# Patient Record
Sex: MALE | Race: WHITE | NOT HISPANIC OR LATINO | Employment: FULL TIME | URBAN - NONMETROPOLITAN AREA
[De-identification: names, ages, dates, MRNs, and addresses within clinical notes are randomized per-mention and may not be internally consistent; named-entity substitution may affect disease eponyms.]

---

## 2023-11-01 PROBLEM — F17.210 CIGARETTE NICOTINE DEPENDENCE WITHOUT COMPLICATION: Status: ACTIVE | Noted: 2023-11-01

## 2023-11-01 PROBLEM — J45.30 MILD PERSISTENT ASTHMA (HHS-HCC): Status: ACTIVE | Noted: 2023-11-01

## 2023-11-01 RX ORDER — ALBUTEROL SULFATE 90 UG/1
AEROSOL, METERED RESPIRATORY (INHALATION) EVERY 4 HOURS
COMMUNITY
Start: 2022-12-20

## 2023-11-02 ENCOUNTER — OFFICE VISIT (OUTPATIENT)
Dept: PRIMARY CARE | Facility: CLINIC | Age: 28
End: 2023-11-02
Payer: COMMERCIAL

## 2023-11-02 VITALS
OXYGEN SATURATION: 98 % | WEIGHT: 165 LBS | HEART RATE: 98 BPM | SYSTOLIC BLOOD PRESSURE: 124 MMHG | BODY MASS INDEX: 22.35 KG/M2 | TEMPERATURE: 98.6 F | DIASTOLIC BLOOD PRESSURE: 50 MMHG | HEIGHT: 72 IN

## 2023-11-02 DIAGNOSIS — F41.1 GAD (GENERALIZED ANXIETY DISORDER): Primary | ICD-10-CM

## 2023-11-02 PROCEDURE — 4004F PT TOBACCO SCREEN RCVD TLK: CPT | Performed by: FAMILY MEDICINE

## 2023-11-02 PROCEDURE — 99214 OFFICE O/P EST MOD 30 MIN: CPT | Performed by: FAMILY MEDICINE

## 2023-11-02 RX ORDER — CLONAZEPAM 0.5 MG/1
0.5 TABLET ORAL NIGHTLY
Qty: 30 TABLET | Refills: 1 | Status: SHIPPED | OUTPATIENT
Start: 2023-11-02 | End: 2023-11-14 | Stop reason: ALTCHOICE

## 2023-11-02 ASSESSMENT — ENCOUNTER SYMPTOMS
DEPRESSION: 0
DYSPHORIC MOOD: 1
FATIGUE: 1
SLEEP DISTURBANCE: 1
TREMORS: 0
DIZZINESS: 0
FEVER: 0
SEIZURES: 0
HALLUCINATIONS: 0
NERVOUS/ANXIOUS: 1
HEADACHES: 0
NUMBNESS: 0
LOSS OF SENSATION IN FEET: 0
OCCASIONAL FEELINGS OF UNSTEADINESS: 0
CHILLS: 0
SHORTNESS OF BREATH: 0
PALPITATIONS: 0
COUGH: 0

## 2023-11-02 ASSESSMENT — PAIN SCALES - GENERAL: PAINLEVEL: 0-NO PAIN

## 2023-11-02 ASSESSMENT — PATIENT HEALTH QUESTIONNAIRE - PHQ9
1. LITTLE INTEREST OR PLEASURE IN DOING THINGS: NOT AT ALL
SUM OF ALL RESPONSES TO PHQ9 QUESTIONS 1 AND 2: 0
2. FEELING DOWN, DEPRESSED OR HOPELESS: NOT AT ALL

## 2023-11-02 NOTE — PROGRESS NOTES
Subjective   Patient ID: Chris Perkins is a 28 y.o. male who presents for Anxiety.    Anxiety  Symptoms include nervous/anxious behavior. Patient reports no chest pain, dizziness, palpitations, shortness of breath or suicidal ideas.            Review of Systems   Constitutional:  Positive for fatigue. Negative for chills and fever.   Respiratory:  Negative for cough and shortness of breath.    Cardiovascular:  Negative for chest pain and palpitations.   Neurological:  Negative for dizziness, tremors, seizures, syncope, numbness and headaches.   Psychiatric/Behavioral:  Positive for dysphoric mood and sleep disturbance. Negative for hallucinations and suicidal ideas. The patient is nervous/anxious.        Objective   /50   Pulse 98   Temp 37 °C (98.6 °F)   Ht 1.829 m (6')   Wt 74.8 kg (165 lb)   SpO2 98%   BMI 22.38 kg/m²     Physical Exam  Constitutional:       Appearance: Normal appearance.   Cardiovascular:      Rate and Rhythm: Normal rate and regular rhythm.   Pulmonary:      Effort: Pulmonary effort is normal.      Breath sounds: Normal breath sounds.   Neurological:      General: No focal deficit present.      Mental Status: He is alert and oriented to person, place, and time.   Psychiatric:         Mood and Affect: Mood is anxious. Affect is tearful.         Speech: Speech normal. Speech is not rapid and pressured, slurred or tangential.         Behavior: Behavior normal. Behavior is cooperative.         Thought Content: Thought content normal.         Cognition and Memory: Cognition normal.         Judgment: Judgment normal.         Assessment/Plan   Problem List Items Addressed This Visit             ICD-10-CM    KELSEY (generalized anxiety disorder) - Primary F41.1   Patient has failed Wellbutrin BuSpar as well as Lexapro in the past.  He has had some good luck with use of Klonopin low-dose.  He does have good coping skills and has previously seen a therapist.  Restart that first with close  follow-up and then investigate other options including possible ADD treatment.

## 2023-11-10 ENCOUNTER — TELEPHONE (OUTPATIENT)
Dept: PRIMARY CARE | Facility: CLINIC | Age: 28
End: 2023-11-10
Payer: COMMERCIAL

## 2023-11-10 DIAGNOSIS — F41.1 GAD (GENERALIZED ANXIETY DISORDER): ICD-10-CM

## 2023-11-10 NOTE — TELEPHONE ENCOUNTER
Pt called office stating that he is taking more of his clonazepam then what was prescribed. Pt stated he is taking 2 tablets at night to make it 1 mg instead of 1 tablet of the 0.5 mg. Pt stated that his refill date is the 12/02/2023 but will need a refill by 11/24/2023. Pt is wondering if he is going to be able to get refill or how would he go about this? Pt stated he has appt on 12/07/2023 to follow up on this rx.

## 2023-11-14 DIAGNOSIS — F41.1 GAD (GENERALIZED ANXIETY DISORDER): Primary | ICD-10-CM

## 2023-11-14 RX ORDER — CLONAZEPAM 1 MG/1
1 TABLET ORAL NIGHTLY
Qty: 30 TABLET | Refills: 0 | Status: SHIPPED | OUTPATIENT
Start: 2023-11-14 | End: 2023-12-22 | Stop reason: DRUGHIGH

## 2023-11-14 NOTE — TELEPHONE ENCOUNTER
Patient is reporting the pharmacy states we will need to cancel the current rx and re order with new dose. He attempted to pay out of pocket, however unable to do so due to rx is controlled .

## 2023-11-21 ENCOUNTER — OFFICE VISIT (OUTPATIENT)
Dept: PRIMARY CARE | Facility: CLINIC | Age: 28
End: 2023-11-21
Payer: COMMERCIAL

## 2023-11-21 VITALS
TEMPERATURE: 98.8 F | DIASTOLIC BLOOD PRESSURE: 50 MMHG | HEIGHT: 72 IN | SYSTOLIC BLOOD PRESSURE: 130 MMHG | HEART RATE: 87 BPM | WEIGHT: 159.6 LBS | OXYGEN SATURATION: 99 % | BODY MASS INDEX: 21.62 KG/M2

## 2023-11-21 DIAGNOSIS — F41.1 GAD (GENERALIZED ANXIETY DISORDER): Primary | ICD-10-CM

## 2023-11-21 DIAGNOSIS — F98.8 ATTENTION DEFICIT DISORDER (ADD) WITHOUT HYPERACTIVITY: ICD-10-CM

## 2023-11-21 DIAGNOSIS — B17.10 ACUTE HEPATITIS C VIRUS INFECTION WITHOUT HEPATIC COMA: ICD-10-CM

## 2023-11-21 PROCEDURE — 99214 OFFICE O/P EST MOD 30 MIN: CPT | Performed by: FAMILY MEDICINE

## 2023-11-21 PROCEDURE — 4004F PT TOBACCO SCREEN RCVD TLK: CPT | Performed by: FAMILY MEDICINE

## 2023-11-21 RX ORDER — DEXTROAMPHETAMINE SACCHARATE, AMPHETAMINE ASPARTATE MONOHYDRATE, DEXTROAMPHETAMINE SULFATE AND AMPHETAMINE SULFATE 5; 5; 5; 5 MG/1; MG/1; MG/1; MG/1
20 CAPSULE, EXTENDED RELEASE ORAL EVERY MORNING
Qty: 30 CAPSULE | Refills: 0 | Status: SHIPPED | OUTPATIENT
Start: 2023-12-21 | End: 2024-01-15 | Stop reason: SDUPTHER

## 2023-11-21 RX ORDER — DEXTROAMPHETAMINE SACCHARATE, AMPHETAMINE ASPARTATE MONOHYDRATE, DEXTROAMPHETAMINE SULFATE AND AMPHETAMINE SULFATE 5; 5; 5; 5 MG/1; MG/1; MG/1; MG/1
20 CAPSULE, EXTENDED RELEASE ORAL EVERY MORNING
Qty: 30 CAPSULE | Refills: 0 | Status: SHIPPED | OUTPATIENT
Start: 2024-01-20 | End: 2024-01-15 | Stop reason: SDUPTHER

## 2023-11-21 RX ORDER — DEXTROAMPHETAMINE SACCHARATE, AMPHETAMINE ASPARTATE MONOHYDRATE, DEXTROAMPHETAMINE SULFATE AND AMPHETAMINE SULFATE 5; 5; 5; 5 MG/1; MG/1; MG/1; MG/1
20 CAPSULE, EXTENDED RELEASE ORAL EVERY MORNING
Qty: 30 CAPSULE | Refills: 0 | Status: SHIPPED | OUTPATIENT
Start: 2023-11-21 | End: 2023-12-13 | Stop reason: SDUPTHER

## 2023-11-21 RX ORDER — BUSPIRONE HYDROCHLORIDE 10 MG/1
10 TABLET ORAL 3 TIMES DAILY
Qty: 270 TABLET | Refills: 1 | Status: SHIPPED | OUTPATIENT
Start: 2023-11-21 | End: 2024-01-15 | Stop reason: SINTOL

## 2023-11-21 ASSESSMENT — ENCOUNTER SYMPTOMS
DECREASED CONCENTRATION: 1
SHORTNESS OF BREATH: 0
DYSPHORIC MOOD: 0
FATIGUE: 1
HEADACHES: 0
COUGH: 0
OCCASIONAL FEELINGS OF UNSTEADINESS: 0
NUMBNESS: 0
HALLUCINATIONS: 0
PALPITATIONS: 0
DIZZINESS: 0
NERVOUS/ANXIOUS: 1
TREMORS: 0
LOSS OF SENSATION IN FEET: 0
FEVER: 0
SEIZURES: 0
CHILLS: 0
SLEEP DISTURBANCE: 0
DEPRESSION: 0

## 2023-11-21 ASSESSMENT — PATIENT HEALTH QUESTIONNAIRE - PHQ9
SUM OF ALL RESPONSES TO PHQ9 QUESTIONS 1 AND 2: 0
1. LITTLE INTEREST OR PLEASURE IN DOING THINGS: NOT AT ALL
2. FEELING DOWN, DEPRESSED OR HOPELESS: NOT AT ALL

## 2023-11-21 ASSESSMENT — PAIN SCALES - GENERAL: PAINLEVEL: 0-NO PAIN

## 2023-11-21 NOTE — PROGRESS NOTES
Subjective   Patient ID: Chris Perkins is a 28 y.o. male who presents for Follow-up.    ADD- pt with trouble focusing in day and this adds to anxiety. He has been using klonopin in day to help. This though leads to some sedation in evenings. And using 1mg routinely in evening. Now able to sleep through the night.     Anxiety  Symptoms include decreased concentration and nervous/anxious behavior. Patient reports no chest pain, dizziness, palpitations, shortness of breath or suicidal ideas.            Review of Systems   Constitutional:  Positive for fatigue. Negative for chills and fever.   Respiratory:  Negative for cough and shortness of breath.    Cardiovascular:  Negative for chest pain and palpitations.   Neurological:  Negative for dizziness, tremors, seizures, syncope, numbness and headaches.   Psychiatric/Behavioral:  Positive for decreased concentration. Negative for dysphoric mood, hallucinations, sleep disturbance and suicidal ideas. The patient is nervous/anxious.        Objective   /50   Pulse 87   Temp 37.1 °C (98.8 °F) (Temporal)   Ht 1.829 m (6')   Wt 72.4 kg (159 lb 9.6 oz)   SpO2 99%   BMI 21.65 kg/m²     Physical Exam  Constitutional:       Appearance: Normal appearance.   Cardiovascular:      Rate and Rhythm: Normal rate and regular rhythm.   Pulmonary:      Effort: Pulmonary effort is normal.      Breath sounds: Normal breath sounds.   Neurological:      General: No focal deficit present.      Mental Status: He is alert and oriented to person, place, and time.   Psychiatric:         Attention and Perception: He does not perceive auditory or visual hallucinations.         Mood and Affect: Mood is not anxious. Affect is blunt. Affect is not tearful.         Speech: Speech normal. Speech is not rapid and pressured, slurred or tangential.         Behavior: Behavior normal. Behavior is cooperative.         Thought Content: Thought content normal.         Cognition and Memory: Cognition  normal.         Judgment: Judgment normal. Judgment is not impulsive.         Assessment/Plan   Problem List Items Addressed This Visit             ICD-10-CM    KELSEY (generalized anxiety disorder) - Primary F41.1     Other Visit Diagnoses         Codes    Attention deficit disorder (ADD) without hyperactivity     F98.8    Acute hepatitis C virus infection without hepatic coma     B17.10        Patient has failed Wellbutrin as well as Lexapro in the past.  He is using klonopin and this is helping. He does have good coping skills and has previously seen a therapist.  Start adderall xr for daytime and add buspar for anxiety during day with klonopin at night-1mg half or whole. Refer to gi for hep c.

## 2023-11-29 RX ORDER — CLONAZEPAM 0.5 MG/1
1 TABLET ORAL NIGHTLY
Qty: 30 TABLET | Refills: 1 | Status: SHIPPED | OUTPATIENT
Start: 2023-11-29 | End: 2023-12-22 | Stop reason: SDUPTHER

## 2023-12-07 ENCOUNTER — APPOINTMENT (OUTPATIENT)
Dept: PRIMARY CARE | Facility: CLINIC | Age: 28
End: 2023-12-07
Payer: COMMERCIAL

## 2023-12-13 DIAGNOSIS — F98.8 ATTENTION DEFICIT DISORDER (ADD) WITHOUT HYPERACTIVITY: ICD-10-CM

## 2023-12-13 NOTE — TELEPHONE ENCOUNTER
Rx refill:   Adderall xr 20 mg once a day   Pt stated he is going out of town leaving the    [facility] in [place]   Lov:  with dr. VANEGAS   [email or web address]

## 2023-12-14 RX ORDER — DEXTROAMPHETAMINE SACCHARATE, AMPHETAMINE ASPARTATE MONOHYDRATE, DEXTROAMPHETAMINE SULFATE AND AMPHETAMINE SULFATE 5; 5; 5; 5 MG/1; MG/1; MG/1; MG/1
20 CAPSULE, EXTENDED RELEASE ORAL EVERY MORNING
Qty: 30 CAPSULE | Refills: 0 | Status: SHIPPED | OUTPATIENT
Start: 2023-12-14 | End: 2024-01-15 | Stop reason: SDUPTHER

## 2023-12-22 DIAGNOSIS — F41.1 GAD (GENERALIZED ANXIETY DISORDER): ICD-10-CM

## 2023-12-22 RX ORDER — CLONAZEPAM 0.5 MG/1
1 TABLET ORAL NIGHTLY
Qty: 30 TABLET | Refills: 1 | Status: SHIPPED | OUTPATIENT
Start: 2023-12-22 | End: 2024-01-15 | Stop reason: SDUPTHER

## 2024-01-11 ENCOUNTER — APPOINTMENT (OUTPATIENT)
Dept: GASTROENTEROLOGY | Facility: CLINIC | Age: 29
End: 2024-01-11
Payer: COMMERCIAL

## 2024-01-11 ENCOUNTER — TELEPHONE (OUTPATIENT)
Dept: PRIMARY CARE | Facility: CLINIC | Age: 29
End: 2024-01-11
Payer: COMMERCIAL

## 2024-01-11 NOTE — TELEPHONE ENCOUNTER
Spoke w Dr Barajas. She advised patietn bring urine sample in tomorrow for urine culture. Encouraged fluids and keep appt on Monday. Call to patient detailed message left on VM indicating patient to bring urine in on Friday for culture to be sent.  Encouraged to call office with any questions

## 2024-01-11 NOTE — TELEPHONE ENCOUNTER
"Patient called with c/o cold,cough, and congestion x 5 days. Negative for COVID. Also urgency  with urination. Patient reports he dipped his urine at home and it was positive for leukocytes. Patient concerned with sepsis,  denies history of sepsis.He also states that he has been sweating \"more than usual\" Appt made for Monday.  No drug allergies.   "

## 2024-01-12 ENCOUNTER — CLINICAL SUPPORT (OUTPATIENT)
Dept: PRIMARY CARE | Facility: CLINIC | Age: 29
End: 2024-01-12
Payer: COMMERCIAL

## 2024-01-12 DIAGNOSIS — R30.0 BURNING WITH URINATION: ICD-10-CM

## 2024-01-12 LAB
POC APPEARANCE, URINE: CLEAR
POC BILIRUBIN, URINE: NEGATIVE
POC BLOOD, URINE: NEGATIVE
POC COLOR, URINE: YELLOW
POC GLUCOSE, URINE: NEGATIVE MG/DL
POC KETONES, URINE: NEGATIVE MG/DL
POC LEUKOCYTES, URINE: NEGATIVE
POC NITRITE,URINE: NEGATIVE
POC PH, URINE: 7.5 PH
POC PROTEIN, URINE: NEGATIVE MG/DL
POC SPECIFIC GRAVITY, URINE: 1.01
POC UROBILINOGEN, URINE: 1 EU/DL

## 2024-01-12 PROCEDURE — 87086 URINE CULTURE/COLONY COUNT: CPT

## 2024-01-12 PROCEDURE — 81002 URINALYSIS NONAUTO W/O SCOPE: CPT | Performed by: NURSE PRACTITIONER

## 2024-01-14 LAB — BACTERIA UR CULT: NO GROWTH

## 2024-01-15 ENCOUNTER — OFFICE VISIT (OUTPATIENT)
Dept: PRIMARY CARE | Facility: CLINIC | Age: 29
End: 2024-01-15
Payer: COMMERCIAL

## 2024-01-15 VITALS
HEIGHT: 72 IN | WEIGHT: 146.4 LBS | TEMPERATURE: 98.4 F | DIASTOLIC BLOOD PRESSURE: 68 MMHG | SYSTOLIC BLOOD PRESSURE: 122 MMHG | HEART RATE: 67 BPM | BODY MASS INDEX: 19.83 KG/M2 | OXYGEN SATURATION: 95 %

## 2024-01-15 DIAGNOSIS — F41.1 GAD (GENERALIZED ANXIETY DISORDER): ICD-10-CM

## 2024-01-15 DIAGNOSIS — F90.0 ATTENTION DEFICIT HYPERACTIVITY DISORDER (ADHD), PREDOMINANTLY INATTENTIVE TYPE: ICD-10-CM

## 2024-01-15 DIAGNOSIS — J40 BRONCHITIS: Primary | ICD-10-CM

## 2024-01-15 DIAGNOSIS — F98.8 ATTENTION DEFICIT DISORDER (ADD) WITHOUT HYPERACTIVITY: ICD-10-CM

## 2024-01-15 PROBLEM — S61.218A LACERATION OF INDEX FINGER: Status: ACTIVE | Noted: 2023-09-26

## 2024-01-15 PROBLEM — K08.89 TOOTHACHE: Status: ACTIVE | Noted: 2023-09-02

## 2024-01-15 PROBLEM — B17.10 ACUTE HEPATITIS C VIRUS INFECTION: Status: ACTIVE | Noted: 2022-12-20

## 2024-01-15 PROCEDURE — 99214 OFFICE O/P EST MOD 30 MIN: CPT | Performed by: FAMILY MEDICINE

## 2024-01-15 PROCEDURE — 4004F PT TOBACCO SCREEN RCVD TLK: CPT | Performed by: FAMILY MEDICINE

## 2024-01-15 RX ORDER — DEXTROAMPHETAMINE SACCHARATE, AMPHETAMINE ASPARTATE MONOHYDRATE, DEXTROAMPHETAMINE SULFATE AND AMPHETAMINE SULFATE 5; 5; 5; 5 MG/1; MG/1; MG/1; MG/1
20 CAPSULE, EXTENDED RELEASE ORAL EVERY MORNING
Qty: 30 CAPSULE | Refills: 0 | Status: SHIPPED | OUTPATIENT
Start: 2024-02-14 | End: 2024-03-05 | Stop reason: SDUPTHER

## 2024-01-15 RX ORDER — DEXTROAMPHETAMINE SACCHARATE, AMPHETAMINE ASPARTATE MONOHYDRATE, DEXTROAMPHETAMINE SULFATE AND AMPHETAMINE SULFATE 5; 5; 5; 5 MG/1; MG/1; MG/1; MG/1
20 CAPSULE, EXTENDED RELEASE ORAL EVERY MORNING
Qty: 30 CAPSULE | Refills: 0 | Status: SHIPPED | OUTPATIENT
Start: 2024-01-15 | End: 2024-01-18 | Stop reason: SDUPTHER

## 2024-01-15 RX ORDER — CLONAZEPAM 0.5 MG/1
1 TABLET ORAL NIGHTLY
Qty: 30 TABLET | Refills: 2 | Status: SHIPPED | OUTPATIENT
Start: 2024-01-15 | End: 2024-03-18 | Stop reason: SDUPTHER

## 2024-01-15 RX ORDER — DEXTROAMPHETAMINE SACCHARATE, AMPHETAMINE ASPARTATE MONOHYDRATE, DEXTROAMPHETAMINE SULFATE AND AMPHETAMINE SULFATE 5; 5; 5; 5 MG/1; MG/1; MG/1; MG/1
20 CAPSULE, EXTENDED RELEASE ORAL EVERY MORNING
Qty: 30 CAPSULE | Refills: 0 | Status: SHIPPED | OUTPATIENT
Start: 2024-03-14 | End: 2024-02-14 | Stop reason: SDUPTHER

## 2024-01-15 RX ORDER — BENZONATATE 100 MG/1
100 CAPSULE ORAL 3 TIMES DAILY PRN
Qty: 42 CAPSULE | Refills: 0 | Status: SHIPPED | OUTPATIENT
Start: 2024-01-15 | End: 2024-02-06 | Stop reason: WASHOUT

## 2024-01-15 RX ORDER — DOXYCYCLINE 100 MG/1
100 CAPSULE ORAL 2 TIMES DAILY
Qty: 28 CAPSULE | Refills: 0 | Status: SHIPPED | OUTPATIENT
Start: 2024-01-15 | End: 2024-01-29

## 2024-01-15 ASSESSMENT — ENCOUNTER SYMPTOMS
DYSPHORIC MOOD: 0
SHORTNESS OF BREATH: 1
LOSS OF SENSATION IN FEET: 0
DIZZINESS: 0
SORE THROAT: 0
NERVOUS/ANXIOUS: 1
COUGH: 1
CHEST TIGHTNESS: 0
POLYDIPSIA: 0
BACK PAIN: 0
APPETITE CHANGE: 0
OCCASIONAL FEELINGS OF UNSTEADINESS: 0
SINUS PAIN: 1
NAUSEA: 0
DIARRHEA: 0
HEADACHES: 1
CONSTIPATION: 0
VOMITING: 0
PALPITATIONS: 0
DYSURIA: 0
ABDOMINAL PAIN: 0
FREQUENCY: 0
ARTHRALGIAS: 0
FATIGUE: 0
DEPRESSION: 0
SINUS PRESSURE: 1
FEVER: 0
TREMORS: 0

## 2024-01-15 ASSESSMENT — LIFESTYLE VARIABLES: HOW OFTEN DO YOU HAVE A DRINK CONTAINING ALCOHOL: NEVER

## 2024-01-15 ASSESSMENT — PAIN SCALES - GENERAL: PAINLEVEL: 2

## 2024-01-15 NOTE — PROGRESS NOTES
Subjective   Patient ID: Chris Perkins is a 28 y.o. male who presents for Cough, Sinusitis, and Headache (Dropped off urine Friday.).    Cough  Associated symptoms include chest pain, headaches and shortness of breath. Pertinent negatives include no fever, postnasal drip, rash or sore throat.   Sinusitis  Associated symptoms include congestion, coughing, headaches, shortness of breath and sinus pressure. Pertinent negatives include no sore throat.   Headache   Associated symptoms include coughing and sinus pressure. Pertinent negatives include no abdominal pain, back pain, dizziness, fever, nausea, sore throat or vomiting.      KELSEY-decreased klonopin use-only at hs now. Not drinking.     Review of Systems   Constitutional:  Negative for appetite change, fatigue and fever.   HENT:  Positive for congestion, sinus pressure and sinus pain. Negative for postnasal drip and sore throat.    Eyes:  Negative for visual disturbance.   Respiratory:  Positive for cough and shortness of breath. Negative for chest tightness.    Cardiovascular:  Positive for chest pain. Negative for palpitations and leg swelling.   Gastrointestinal:  Negative for abdominal pain, constipation, diarrhea, nausea and vomiting.   Endocrine: Negative for polydipsia and polyuria.   Genitourinary:  Negative for dysuria, frequency and urgency.   Musculoskeletal:  Negative for arthralgias, back pain and gait problem.   Skin:  Negative for rash.   Neurological:  Positive for headaches. Negative for dizziness, tremors and syncope.   Psychiatric/Behavioral:  Negative for dysphoric mood. The patient is nervous/anxious.        Objective   /68   Pulse 67   Temp 36.9 °C (98.4 °F)   Ht 1.829 m (6')   Wt 66.4 kg (146 lb 6.4 oz)   SpO2 95%   BMI 19.86 kg/m²     Physical Exam  Vitals reviewed.   Constitutional:       Appearance: Normal appearance.   HENT:      Head: Normocephalic.      Right Ear: Tympanic membrane normal.      Left Ear: Tympanic membrane  normal.      Nose: Congestion present.      Mouth/Throat:      Pharynx: Oropharynx is clear. No oropharyngeal exudate or posterior oropharyngeal erythema.   Eyes:      Extraocular Movements: Extraocular movements intact.      Pupils: Pupils are equal, round, and reactive to light.   Cardiovascular:      Rate and Rhythm: Normal rate and regular rhythm.   Pulmonary:      Effort: Pulmonary effort is normal.      Breath sounds: Wheezing and rhonchi present. No rales.   Neurological:      General: No focal deficit present.      Mental Status: He is alert.   Psychiatric:         Mood and Affect: Mood normal.         Assessment/Plan   Problem List Items Addressed This Visit             ICD-10-CM    KELSEY (generalized anxiety disorder) F41.1    Attention deficit hyperactivity disorder (ADHD), predominantly inattentive type F90.0     Other Visit Diagnoses         Codes    Bronchitis    -  Primary J40

## 2024-01-16 ENCOUNTER — APPOINTMENT (OUTPATIENT)
Dept: PRIMARY CARE | Facility: CLINIC | Age: 29
End: 2024-01-16
Payer: COMMERCIAL

## 2024-01-18 ENCOUNTER — TELEPHONE (OUTPATIENT)
Dept: PRIMARY CARE | Facility: CLINIC | Age: 29
End: 2024-01-18
Payer: COMMERCIAL

## 2024-01-18 DIAGNOSIS — F98.8 ATTENTION DEFICIT DISORDER (ADD) WITHOUT HYPERACTIVITY: ICD-10-CM

## 2024-01-18 RX ORDER — DEXTROAMPHETAMINE SACCHARATE, AMPHETAMINE ASPARTATE MONOHYDRATE, DEXTROAMPHETAMINE SULFATE AND AMPHETAMINE SULFATE 5; 5; 5; 5 MG/1; MG/1; MG/1; MG/1
20 CAPSULE, EXTENDED RELEASE ORAL EVERY MORNING
Qty: 30 CAPSULE | Refills: 0 | Status: SHIPPED | OUTPATIENT
Start: 2024-01-18 | End: 2024-03-05 | Stop reason: SDUPTHER

## 2024-01-18 NOTE — TELEPHONE ENCOUNTER
Chris would like to talk to you about his adderall rx that got kicked back from [facility] because they are saying it's too soon to be filled.  Thank you

## 2024-01-29 ENCOUNTER — APPOINTMENT (OUTPATIENT)
Dept: GASTROENTEROLOGY | Facility: CLINIC | Age: 29
End: 2024-01-29
Payer: COMMERCIAL

## 2024-02-06 ENCOUNTER — OFFICE VISIT (OUTPATIENT)
Dept: GASTROENTEROLOGY | Facility: CLINIC | Age: 29
End: 2024-02-06
Payer: COMMERCIAL

## 2024-02-06 VITALS
TEMPERATURE: 99.2 F | DIASTOLIC BLOOD PRESSURE: 85 MMHG | HEART RATE: 69 BPM | HEIGHT: 72 IN | WEIGHT: 145.2 LBS | BODY MASS INDEX: 19.67 KG/M2 | SYSTOLIC BLOOD PRESSURE: 140 MMHG

## 2024-02-06 DIAGNOSIS — B17.10 ACUTE HEPATITIS C VIRUS INFECTION WITHOUT HEPATIC COMA: ICD-10-CM

## 2024-02-06 DIAGNOSIS — R74.01 ELEVATION OF LEVELS OF LIVER TRANSAMINASE LEVELS: Primary | ICD-10-CM

## 2024-02-06 PROCEDURE — 99203 OFFICE O/P NEW LOW 30 MIN: CPT | Performed by: STUDENT IN AN ORGANIZED HEALTH CARE EDUCATION/TRAINING PROGRAM

## 2024-02-06 PROCEDURE — 99213 OFFICE O/P EST LOW 20 MIN: CPT | Performed by: STUDENT IN AN ORGANIZED HEALTH CARE EDUCATION/TRAINING PROGRAM

## 2024-02-06 PROCEDURE — 4004F PT TOBACCO SCREEN RCVD TLK: CPT | Performed by: STUDENT IN AN ORGANIZED HEALTH CARE EDUCATION/TRAINING PROGRAM

## 2024-02-06 ASSESSMENT — ENCOUNTER SYMPTOMS
NAUSEA: 0
UNEXPECTED WEIGHT CHANGE: 0
DEPRESSION: 0
FEVER: 0
OCCASIONAL FEELINGS OF UNSTEADINESS: 0
TROUBLE SWALLOWING: 0
ABDOMINAL DISTENTION: 0
ANAL BLEEDING: 1
VOMITING: 0
RECTAL PAIN: 0
BLOOD IN STOOL: 0
COLOR CHANGE: 0
DIARRHEA: 0
SHORTNESS OF BREATH: 0
CHILLS: 0
CONSTIPATION: 0
ABDOMINAL PAIN: 0
LOSS OF SENSATION IN FEET: 0

## 2024-02-06 ASSESSMENT — COLUMBIA-SUICIDE SEVERITY RATING SCALE - C-SSRS
2. HAVE YOU ACTUALLY HAD ANY THOUGHTS OF KILLING YOURSELF?: NO
6. HAVE YOU EVER DONE ANYTHING, STARTED TO DO ANYTHING, OR PREPARED TO DO ANYTHING TO END YOUR LIFE?: NO
1. IN THE PAST MONTH, HAVE YOU WISHED YOU WERE DEAD OR WISHED YOU COULD GO TO SLEEP AND NOT WAKE UP?: NO

## 2024-02-06 ASSESSMENT — PATIENT HEALTH QUESTIONNAIRE - PHQ9
SUM OF ALL RESPONSES TO PHQ9 QUESTIONS 1 & 2: 0
2. FEELING DOWN, DEPRESSED OR HOPELESS: NOT AT ALL
1. LITTLE INTEREST OR PLEASURE IN DOING THINGS: NOT AT ALL

## 2024-02-06 NOTE — PROGRESS NOTES
Chief Complaint:  Chief Complaint   Patient presents with    New Patient Visit     Hepatitis C treatment, Has it for 6 years. Constipation on and off, diet related.        Hep C, since 2006, found on routine blood screen.     Unclear mode of transmission, suspected through anal intercourse.   No hx of IV or intranasal drug use  Has had tattoos in questionable facilities    No current anal intercourse.   No jaundice, fatigue, weight loss    Prior heavy alcohol use but now abstinent.     Prior Intermittent LUQ, middle abdominal  pain, for last 6-7 mo, associated with bloating, improved since quitting alcohol    BM 7 times a week but not always daily   [place] 4/5   Intermittent blood when wiping, associated with anal fissures from prior anal intercourse    Intermittent fasts for 12-14 hours   Protein powder   Ensure     No herbal supplements  No family hx   Grandfather with liver disease, was alcoholic  No colon cancer                     Review of Systems   Constitutional:  Negative for chills, fever and unexpected weight change.   HENT:  Negative for trouble swallowing.    Respiratory:  Negative for shortness of breath.    Cardiovascular:  Negative for chest pain.   Gastrointestinal:  Positive for anal bleeding. Negative for abdominal distention, abdominal pain, blood in stool, constipation, diarrhea, nausea, rectal pain and vomiting.   Skin:  Negative for color change.     Family History   Problem Relation Name Age of Onset    Diabetes Mother          acute    Thyroid disease Mother      No Known Problems Father      No Known Problems Sister      No Known Problems Brother      Liver cancer Maternal Grandmother          diagnosed    Colon cancer Maternal Grandmother          undiagnosed    COPD Paternal Grandmother      Lung cancer Paternal Grandfather         Medications    Current Outpatient Medications:     albuterol 90 mcg/actuation inhaler, every 4 hours., Disp: , Rfl:     [START ON 3/14/2024]  "amphetamine-dextroamphetamine XR (Adderall XR) 20 mg 24 hr capsule, Take 1 capsule (20 mg) by mouth once daily in the morning. Do not crush or chew. Do not start before March 14, 2024., Disp: 30 capsule, Rfl: 0    [START ON 2/14/2024] amphetamine-dextroamphetamine XR (Adderall XR) 20 mg 24 hr capsule, Take 1 capsule (20 mg) by mouth once daily in the morning. Do not crush or chew. Do not start before February 14, 2024., Disp: 30 capsule, Rfl: 0    amphetamine-dextroamphetamine XR (Adderall XR) 20 mg 24 hr capsule, Take 1 capsule (20 mg) by mouth once daily in the morning. Do not crush or chew., Disp: 30 capsule, Rfl: 0    clonazePAM (KlonoPIN) 0.5 mg tablet, Take 2 tablets (1 mg) by mouth once daily at bedtime., Disp: 30 tablet, Rfl: 2    Vitals  /85 (BP Location: Left arm, Patient Position: Sitting, BP Cuff Size: Adult)   Pulse 69   Temp 37.3 °C (99.2 °F) (Temporal)   Ht 1.829 m (6')   Wt 65.9 kg (145 lb 3.2 oz)   BMI 19.69 kg/m²     Physical Exam  Constitutional:       General: He is not in acute distress.     Comments: Mild bitemporal wasting   HENT:      Head: Normocephalic and atraumatic.   Eyes:      General: No scleral icterus.     Conjunctiva/sclera: Conjunctivae normal.   Cardiovascular:      Rate and Rhythm: Normal rate.      Heart sounds: Normal heart sounds.   Pulmonary:      Effort: Pulmonary effort is normal.      Breath sounds: No wheezing.   Abdominal:      General: Bowel sounds are normal. There is no distension.      Palpations: Abdomen is soft.      Tenderness: There is no abdominal tenderness. There is no guarding or rebound.      Hernia: No hernia is present.   Skin:     Coloration: Skin is not jaundiced.   Neurological:      Mental Status: He is alert and oriented to person, place, and time.   Psychiatric:         Mood and Affect: Mood normal.           Labs:  No results found for: \"AFP\"No results found for: \"ASMAB\", \"MITOAB\"No results found for: \"MEDINA\"No results found for: \"ASMAB\", " "\"MITOAB\"No results found for: \"ENBJHTSY40\"  Lab Results   Component Value Date    HEPCAB (A) 12/20/2022     REACTIVE  Reference range: NONREACTIVE     Results from patients taking biotin supplements or receiving   high-dose biotin therapy should be interpreted with caution   due to possible interference with this test. Providers may    contact their local laboratory for further information.   HCV antibody detected. HCV RNA PCR has been ordered   to evaluate the possibility of a current infection.  Test Performed at:  [facility]), , , ,   :          Lab Results   Component Value Date    HEPAIGM  12/20/2022     NONREACTIVE  Reference range: NONREACTIVE     Biotin interference may cause falsely decreased results.   Patients taking a Biotin dose of up to 5 mg/day should   refrain from taking Biotin for 24 hours before sample   collection. Providers may contact their local laboratory   for further information.      HEPCAB (A) 12/20/2022     REACTIVE  Reference range: NONREACTIVE     Results from patients taking biotin supplements or receiving   high-dose biotin therapy should be interpreted with caution   due to possible interference with this test. Providers may    contact their local laboratory for further information.   HCV antibody detected. HCV RNA PCR has been ordered   to evaluate the possibility of a current infection.  Test Performed at:  [facility]), , , ,   :          Lab Results   Component Value Date    HIV1X2  12/20/2022     NONREACTIVE  Reference range: NONREACTIVE     HIV Ag/Ab screen is performed using the Siemens inFreeDA   HIV Ag/Ab Combo assay which detects the presence of HIV    p24 antigen as well as antibodies to HIV-1   (Group M and O) and HIV-2.  .  No laboratory evidence of HIV infection. If acute HIV infection is   suspected, consider testing for HIV RNA by PCR (viral load).  Test Performed at:  [facility]), , , ,   :        No results found for: " "\"IRON\", \"TIBC\", \"FERRITIN\"No results found for: \"INR\", \"PROTIME\"  Lab Results   Component Value Date    TSH 0.74 12/20/2022       Radiology  No image results found.      A/P   Chris was seen today for new patient visit.  Diagnoses and all orders for this visit:  Elevation of levels of liver transaminase levels (Primary)  Acute hepatitis C virus infection without hepatic coma  -     Referral to Gastroenterology  -     Liver Elastography (Fibroscan) GI; Future  -     HCV PCR with Genotype Reflex; Future  -     Hepatitis C Virus (HCV) FibroSure; Future  -     CBC; Future  -     Comprehensive metabolic panel; Future  -     Hepatitis B surface antibody; Future     Problem List Items Addressed This Visit             ICD-10-CM    Acute hepatitis C virus infection B17.10     Chronic Hepatitis C, likely sexually transmitted. No stigmata of cirrhosis. Mild AST/ALT elevation on prior labs. No prior treatment. No drug drug interactions. HIV neg, HBcAb neg, HBsAg neg  - discussed treatment options, duration will depend on presence of cirrhosis/advanced fibrosis. If non-cirrhotic likely Mavyret for 8 weeks vs Epclusa for 12 weeks  - recheck HCV VL and genotype  - recheck CBC/CMP  - checl HBsAb and vaccinate accordingly   - elastography and fibrosure to assess for fibrosis  - discussed avoidance of sharing razors, needles, toothbrushes and the use of barrier protection for sexual intercourse   - plan to check HCV 12 weeks after completing treatment          Relevant Orders    Liver Elastography (Fibroscan) GI    HCV PCR with Genotype Reflex    Hepatitis C Virus (HCV) FibroSure    CBC    Comprehensive metabolic panel    Hepatitis B surface antibody    Elevation of levels of liver transaminase levels - Primary R74.01     Mild AST/ALT but preserved synthetic function, likely due to Hep C +/- alcohol  - recheck CMP now           "

## 2024-02-06 NOTE — ASSESSMENT & PLAN NOTE
Mild AST/ALT but preserved synthetic function, likely due to Hep C +/- alcohol  - recheck CMP now

## 2024-02-06 NOTE — ASSESSMENT & PLAN NOTE
Chronic Hepatitis C, likely sexually transmitted. No stigmata of cirrhosis. Mild AST/ALT elevation on prior labs. No prior treatment. No drug drug interactions. HIV neg, HBcAb neg, HBsAg neg  - discussed treatment options, duration will depend on presence of cirrhosis/advanced fibrosis. If non-cirrhotic likely Mavyret for 8 weeks vs Epclusa for 12 weeks  - recheck HCV VL and genotype  - recheck CBC/CMP  - checl HBsAb and vaccinate accordingly   - elastography and fibrosure to assess for fibrosis  - discussed avoidance of sharing razors, needles, toothbrushes and the use of barrier protection for sexual intercourse   - plan to check HCV 12 weeks after completing treatment

## 2024-02-07 ENCOUNTER — APPOINTMENT (OUTPATIENT)
Dept: GASTROENTEROLOGY | Facility: CLINIC | Age: 29
End: 2024-02-07
Payer: COMMERCIAL

## 2024-02-14 DIAGNOSIS — F98.8 ATTENTION DEFICIT DISORDER (ADD) WITHOUT HYPERACTIVITY: Primary | ICD-10-CM

## 2024-02-15 RX ORDER — DEXTROAMPHETAMINE SACCHARATE, AMPHETAMINE ASPARTATE MONOHYDRATE, DEXTROAMPHETAMINE SULFATE AND AMPHETAMINE SULFATE 5; 5; 5; 5 MG/1; MG/1; MG/1; MG/1
20 CAPSULE, EXTENDED RELEASE ORAL EVERY MORNING
Qty: 30 CAPSULE | Refills: 0 | Status: SHIPPED | OUTPATIENT
Start: 2024-03-14 | End: 2024-03-05 | Stop reason: SDUPTHER

## 2024-03-05 ENCOUNTER — OFFICE VISIT (OUTPATIENT)
Dept: PRIMARY CARE | Facility: CLINIC | Age: 29
End: 2024-03-05
Payer: COMMERCIAL

## 2024-03-05 VITALS
WEIGHT: 141.8 LBS | SYSTOLIC BLOOD PRESSURE: 122 MMHG | HEIGHT: 72 IN | DIASTOLIC BLOOD PRESSURE: 64 MMHG | TEMPERATURE: 99.1 F | HEART RATE: 106 BPM | OXYGEN SATURATION: 97 % | BODY MASS INDEX: 19.21 KG/M2

## 2024-03-05 DIAGNOSIS — F41.1 GAD (GENERALIZED ANXIETY DISORDER): ICD-10-CM

## 2024-03-05 DIAGNOSIS — T78.40XA ALLERGIC REACTION, INITIAL ENCOUNTER: Primary | ICD-10-CM

## 2024-03-05 DIAGNOSIS — F17.210 CIGARETTE NICOTINE DEPENDENCE WITHOUT COMPLICATION: ICD-10-CM

## 2024-03-05 PROBLEM — S61.218A LACERATION OF INDEX FINGER: Status: RESOLVED | Noted: 2023-09-26 | Resolved: 2024-03-05

## 2024-03-05 PROBLEM — K08.89 TOOTHACHE: Status: RESOLVED | Noted: 2023-09-02 | Resolved: 2024-03-05

## 2024-03-05 PROCEDURE — 99213 OFFICE O/P EST LOW 20 MIN: CPT | Performed by: NURSE PRACTITIONER

## 2024-03-05 PROCEDURE — 4004F PT TOBACCO SCREEN RCVD TLK: CPT | Performed by: NURSE PRACTITIONER

## 2024-03-05 ASSESSMENT — ENCOUNTER SYMPTOMS
ARTHRALGIAS: 0
ADENOPATHY: 0
EYE REDNESS: 0
FREQUENCY: 0
OCCASIONAL FEELINGS OF UNSTEADINESS: 0
DEPRESSION: 0
BRUISES/BLEEDS EASILY: 0
DIZZINESS: 0
SHORTNESS OF BREATH: 0
BLOOD IN STOOL: 0
DIFFICULTY URINATING: 0
LIGHT-HEADEDNESS: 0
NERVOUS/ANXIOUS: 0
LOSS OF SENSATION IN FEET: 0
EYES NEGATIVE: 1
COUGH: 0
ALLERGIC/IMMUNOLOGIC NEGATIVE: 1
CARDIOVASCULAR NEGATIVE: 1
ENDOCRINE NEGATIVE: 1
AGITATION: 0
PALPITATIONS: 0
CONSTIPATION: 0
APPETITE CHANGE: 0
GASTROINTESTINAL NEGATIVE: 1
HEMATURIA: 0
SORE THROAT: 0
HEADACHES: 0
ABDOMINAL PAIN: 0
DIARRHEA: 0
DYSURIA: 0
TREMORS: 0
NEUROLOGICAL NEGATIVE: 1
WHEEZING: 0
RESPIRATORY NEGATIVE: 1
JOINT SWELLING: 0
ACTIVITY CHANGE: 0
CHEST TIGHTNESS: 0
EYE DISCHARGE: 0

## 2024-03-05 ASSESSMENT — LIFESTYLE VARIABLES
HOW MANY STANDARD DRINKS CONTAINING ALCOHOL DO YOU HAVE ON A TYPICAL DAY: PATIENT DOES NOT DRINK
HOW OFTEN DO YOU HAVE A DRINK CONTAINING ALCOHOL: NEVER

## 2024-03-05 ASSESSMENT — PAIN SCALES - GENERAL: PAINLEVEL: 0-NO PAIN

## 2024-03-05 NOTE — PROGRESS NOTES
Subjective   Patient ID: Chris Perkins is a 28 y.o. male who presents for Follow-up (Pt present for discoloration in his fingers.He is having tingling in his fingers.).    Presents today with concerns for his fingers becoming cyanotic especially after a hot shower.  He notes that he has recently started taking new supplements including Osteo Bi-Flex, fish oil and a protein shake.  Reports the symptoms started almost concurrently with the initiation of these new supplements.  He notes he has taken Osteo Bi-Flex in the past without any side effects.  He denies chest pain or shortness of breath.  He denies pain in his digits.  He does admit to some intermittent tingling to the tips of his fingers.  He reports that he smokes cigarettes and occasional THC.  He states he has not changed brands of cigarettes nor has he changed his supplier for his THC.  He is certain that his THC is clean he reports that he does get it from a dispensary.  He has lab work scheduled for Friday, March 8 to evaluate liver function, hepatitis, kidney function and blood count.  He has a follow-up appointment scheduled with his PCP on April 16.         Review of Systems   Constitutional:  Negative for activity change and appetite change.   HENT:  Negative for congestion, ear pain, hearing loss and sore throat.    Eyes: Negative.  Negative for discharge, redness and visual disturbance.   Respiratory: Negative.  Negative for cough, chest tightness, shortness of breath and wheezing.    Cardiovascular: Negative.  Negative for chest pain, palpitations and leg swelling.   Gastrointestinal: Negative.  Negative for abdominal pain, blood in stool, constipation and diarrhea.   Endocrine: Negative.    Genitourinary: Negative.  Negative for difficulty urinating, dysuria, frequency, hematuria, penile discharge and testicular pain.   Musculoskeletal:  Negative for arthralgias and joint swelling.   Skin:  Negative for rash.   Allergic/Immunologic: Negative.     Neurological: Negative.  Negative for dizziness, tremors, light-headedness and headaches.   Hematological:  Negative for adenopathy. Does not bruise/bleed easily.   Psychiatric/Behavioral:  Negative for agitation. The patient is not nervous/anxious.        Objective   /64 (BP Location: Right arm, Patient Position: Sitting)   Pulse 106   Temp 37.3 °C (99.1 °F) (Temporal)   Ht 1.829 m (6')   Wt 64.3 kg (141 lb 12.8 oz)   SpO2 97%   BMI 19.23 kg/m²     Physical Exam  Vitals reviewed.   Constitutional:       General: He is not in acute distress.     Appearance: Normal appearance.   HENT:      Head: Normocephalic.      Right Ear: Tympanic membrane normal.      Left Ear: Tympanic membrane normal.      Nose: Nose normal.      Mouth/Throat:      Mouth: Mucous membranes are moist.   Eyes:      Conjunctiva/sclera: Conjunctivae normal.      Pupils: Pupils are equal, round, and reactive to light.   Cardiovascular:      Rate and Rhythm: Normal rate and regular rhythm.      Pulses: Normal pulses.      Heart sounds: Normal heart sounds.   Pulmonary:      Effort: Pulmonary effort is normal.      Breath sounds: Normal breath sounds.   Abdominal:      General: Bowel sounds are normal.      Palpations: Abdomen is soft.   Musculoskeletal:         General: Normal range of motion.   Skin:     General: Skin is warm and dry.      Capillary Refill: Capillary refill takes less than 2 seconds.      Coloration: Skin is cyanotic.      Findings: No ecchymosis, signs of injury, lesion or rash.             Comments: Nailbeds cyanotic.  Tips of fingers ready.  Palmar surface and dorsal surface of hand at baseline   Neurological:      General: No focal deficit present.      Mental Status: He is alert and oriented to person, place, and time.   Psychiatric:         Mood and Affect: Mood normal.         Speech: Speech normal.         Assessment/Plan   Problem List Items Addressed This Visit             ICD-10-CM    Cigarette nicotine  dependence without complication F17.210    KELSEY (generalized anxiety disorder) F41.1     Other Visit Diagnoses         Codes    Allergic reaction, initial encounter    -  Primary T78.40XA             Stop all new supplements  After a week then can restart one at a time to see which one you react to.  If symptoms do not improve, follow up with Dr. Barajas  Get labs as previously ordered

## 2024-03-05 NOTE — PATIENT INSTRUCTIONS
Stop all new supplements  After a week then can restart one at a time to see which one you react to.  If symptoms do not improve, follow up with Dr. Barajas  Get labs as previously ordered

## 2024-03-08 ENCOUNTER — CLINICAL SUPPORT (OUTPATIENT)
Dept: GASTROENTEROLOGY | Facility: CLINIC | Age: 29
End: 2024-03-08
Payer: COMMERCIAL

## 2024-03-08 ENCOUNTER — LAB (OUTPATIENT)
Dept: LAB | Facility: LAB | Age: 29
End: 2024-03-08

## 2024-03-08 DIAGNOSIS — B17.10 ACUTE HEPATITIS C VIRUS INFECTION WITHOUT HEPATIC COMA: ICD-10-CM

## 2024-03-08 LAB
ALBUMIN SERPL BCP-MCNC: 4.8 G/DL (ref 3.4–5)
ALP SERPL-CCNC: 55 U/L (ref 33–120)
ALT SERPL W P-5'-P-CCNC: 57 U/L (ref 10–52)
ANION GAP SERPL CALC-SCNC: 13 MMOL/L (ref 10–20)
AST SERPL W P-5'-P-CCNC: 41 U/L (ref 9–39)
BILIRUB SERPL-MCNC: 0.5 MG/DL (ref 0–1.2)
BUN SERPL-MCNC: 21 MG/DL (ref 6–23)
CALCIUM SERPL-MCNC: 10 MG/DL (ref 8.6–10.6)
CHLORIDE SERPL-SCNC: 104 MMOL/L (ref 98–107)
CO2 SERPL-SCNC: 31 MMOL/L (ref 21–32)
CREAT SERPL-MCNC: 0.74 MG/DL (ref 0.5–1.3)
EGFRCR SERPLBLD CKD-EPI 2021: >90 ML/MIN/1.73M*2
ERYTHROCYTE [DISTWIDTH] IN BLOOD BY AUTOMATED COUNT: 13.1 % (ref 11.5–14.5)
GLUCOSE SERPL-MCNC: 87 MG/DL (ref 74–99)
HBV SURFACE AB SER-ACNC: 792 MIU/ML
HCT VFR BLD AUTO: 46.9 % (ref 41–52)
HGB BLD-MCNC: 15.9 G/DL (ref 13.5–17.5)
MCH RBC QN AUTO: 33.5 PG (ref 26–34)
MCHC RBC AUTO-ENTMCNC: 33.9 G/DL (ref 32–36)
MCV RBC AUTO: 99 FL (ref 80–100)
NRBC BLD-RTO: 0 /100 WBCS (ref 0–0)
PLATELET # BLD AUTO: 260 X10*3/UL (ref 150–450)
POTASSIUM SERPL-SCNC: 4.6 MMOL/L (ref 3.5–5.3)
PROT SERPL-MCNC: 7.6 G/DL (ref 6.4–8.2)
RBC # BLD AUTO: 4.75 X10*6/UL (ref 4.5–5.9)
SODIUM SERPL-SCNC: 143 MMOL/L (ref 136–145)
WBC # BLD AUTO: 5.7 X10*3/UL (ref 4.4–11.3)

## 2024-03-08 PROCEDURE — 83883 ASSAY NEPHELOMETRY NOT SPEC: CPT

## 2024-03-08 PROCEDURE — 87902 NFCT AGT GNTYP ALYS HEP C: CPT

## 2024-03-08 PROCEDURE — 86706 HEP B SURFACE ANTIBODY: CPT

## 2024-03-08 PROCEDURE — 80053 COMPREHEN METABOLIC PANEL: CPT

## 2024-03-08 PROCEDURE — 84460 ALANINE AMINO (ALT) (SGPT): CPT

## 2024-03-08 PROCEDURE — 87340 HEPATITIS B SURFACE AG IA: CPT

## 2024-03-08 PROCEDURE — 85027 COMPLETE CBC AUTOMATED: CPT

## 2024-03-08 PROCEDURE — 36415 COLL VENOUS BLD VENIPUNCTURE: CPT

## 2024-03-08 PROCEDURE — 87521 HEPATITIS C PROBE&RVRS TRNSC: CPT

## 2024-03-08 PROCEDURE — 91200 LIVER ELASTOGRAPHY: CPT | Performed by: INTERNAL MEDICINE

## 2024-03-08 NOTE — LETTER
March 13, 2024     Valerie Rosales MD  [address] [place] [address]  [place] [address]    Patient: Chris Perkins   YOB: 1995   Date of Visit: 3/8/2024       Dear Dr. Valerie Rosales MD:    Thank you for referring Chris Perkins to me for evaluation. Below are my notes for this consultation.  If you have questions, please do not hesitate to call me. I look forward to following your patient along with you.       Sincerely,     [id number]      CC: No Recipients  ______________________________________________________________________________________      Results:  E (median liver stiffness measurement):  5.2    kPa  CAP (controlled attenuation parameter):  134   dB/m    Interpretation:  This was a technically adequate study. The Fibrosis score is consistent with Metavir F0 (stage 0 fibrosis) . The CAP score is consistent with 0 - 5 % hepatocyte steatosis (steatosis stage 0 of 3 ) .    Shmuel Hirsch MD  Hepatology

## 2024-03-10 LAB
HCV RNA SERPL NAA+PROBE-ACNC: ABNORMAL IU/ML
HCV RNA SERPL NAA+PROBE-ACNC: DETECTED K[IU]/ML
HCV RNA SERPL NAA+PROBE-LOG IU: 6.23 LOG IU/ML
LABORATORY COMMENT REPORT: ABNORMAL

## 2024-03-13 DIAGNOSIS — B17.10 ACUTE HEPATITIS C VIRUS INFECTION WITHOUT HEPATIC COMA: Primary | ICD-10-CM

## 2024-03-13 LAB
A2 MACROGLOB SERPL-MCNC: 223 MG/DL (ref 110–276)
ALT SERPL W P-5'-P-CCNC: 78 IU/L (ref 0–55)
APO A-I SERPL-MCNC: 145 MG/DL (ref 101–178)
BILIRUB SERPL-MCNC: 0.3 MG/DL (ref 0–1.2)
FIBROSIS SCORING:: ABNORMAL
FIBROSIS STAGE SERPL QL: ABNORMAL
GGT SERPL-CCNC: 54 IU/L (ref 0–65)
HAPTOGLOB SERPL-MCNC: 180 MG/DL (ref 17–317)
HBV SURFACE AG SERPL QL IA: NONREACTIVE
INTERPRETATIONS:(HCVFS): ABNORMAL
LABORATORY COMMENT REPORT: ABNORMAL
LIVER FIBR SCORE SERPL CALC.FIBROSURE: 0.13 (ref 0–0.21)
NECROINFLAMM ACTIVITY SCORING:(HCVFS): ABNORMAL
NECROINFLAMMATORY ACT GRADE SERPL QL: ABNORMAL
NECROINFLAMMATORY ACT SCORE SERPL: 0.4 (ref 0–0.17)
SERVICE CMNT-IMP: ABNORMAL
TEST PERFORMANCE INFO SPEC: ABNORMAL

## 2024-03-13 NOTE — PROGRESS NOTES
Called patient to discuss blood results, active Hep C with mild transaminase elevation but no cirrhosis or advanced fibrosis. HBsAb POS. Does not mean he has hepatitis B. Will add on HBsAg as prior quantity insufficient.     Will plan to treat with Mavyret vs Epclusa pending genotype results.  Discussed dosing and duration. Patient prefers once daily dosing of Epclusa given it can be taken w/o food, as he does intermittent fasting and only eats at night, 12 week duration. Reviewed medications and no drug drug interactions     Will send script in once genotype is received. Will check rpt labs 12 weeks after therapy is completed.

## 2024-03-18 DIAGNOSIS — F90.0 ATTENTION DEFICIT HYPERACTIVITY DISORDER (ADHD), PREDOMINANTLY INATTENTIVE TYPE: ICD-10-CM

## 2024-03-18 DIAGNOSIS — F41.1 GAD (GENERALIZED ANXIETY DISORDER): Primary | ICD-10-CM

## 2024-03-18 PROBLEM — J40 BRONCHITIS: Status: ACTIVE | Noted: 2024-03-18

## 2024-03-18 PROBLEM — T78.40XA ALLERGIC REACTION: Status: ACTIVE | Noted: 2024-03-18

## 2024-03-18 RX ORDER — BUSPIRONE HYDROCHLORIDE 10 MG/1
10 TABLET ORAL EVERY 8 HOURS
COMMUNITY
Start: 2023-11-21 | End: 2024-04-16 | Stop reason: ALTCHOICE

## 2024-03-18 RX ORDER — DEXTROAMPHETAMINE SACCHARATE, AMPHETAMINE ASPARTATE MONOHYDRATE, DEXTROAMPHETAMINE SULFATE AND AMPHETAMINE SULFATE 5; 5; 5; 5 MG/1; MG/1; MG/1; MG/1
20 CAPSULE, EXTENDED RELEASE ORAL EVERY MORNING
COMMUNITY
End: 2024-03-18 | Stop reason: SDUPTHER

## 2024-03-19 RX ORDER — CLONAZEPAM 0.5 MG/1
1 TABLET ORAL NIGHTLY
Qty: 60 TABLET | Refills: 2 | Status: SHIPPED | OUTPATIENT
Start: 2024-03-19 | End: 2024-05-30 | Stop reason: SDUPTHER

## 2024-03-19 RX ORDER — DEXTROAMPHETAMINE SACCHARATE, AMPHETAMINE ASPARTATE MONOHYDRATE, DEXTROAMPHETAMINE SULFATE AND AMPHETAMINE SULFATE 5; 5; 5; 5 MG/1; MG/1; MG/1; MG/1
20 CAPSULE, EXTENDED RELEASE ORAL EVERY MORNING
Qty: 30 CAPSULE | Refills: 0 | Status: SHIPPED | OUTPATIENT
Start: 2024-03-19 | End: 2024-04-12 | Stop reason: SDUPTHER

## 2024-03-20 LAB
ELECTRONICALLY SIGNED BY: NORMAL
HCV GENTYP SERPL SEQ: NORMAL
HEPATITIS C INTERPRETATION: NORMAL

## 2024-03-21 DIAGNOSIS — B17.10 ACUTE HEPATITIS C VIRUS INFECTION WITHOUT HEPATIC COMA: Primary | ICD-10-CM

## 2024-03-21 RX ORDER — VELPATASVIR AND SOFOSBUVIR 100; 400 MG/1; MG/1
1 TABLET, FILM COATED ORAL DAILY
Qty: 84 TABLET | Refills: 0 | Status: SHIPPED | OUTPATIENT
Start: 2024-03-21 | End: 2024-04-02 | Stop reason: SDUPTHER

## 2024-03-22 ENCOUNTER — SPECIALTY PHARMACY (OUTPATIENT)
Dept: PHARMACY | Facility: CLINIC | Age: 29
End: 2024-03-22

## 2024-04-01 ENCOUNTER — SPECIALTY PHARMACY (OUTPATIENT)
Dept: PHARMACY | Facility: CLINIC | Age: 29
End: 2024-04-01

## 2024-04-01 NOTE — PROGRESS NOTES
I spoke to the patient on 4/1/2024, to inform him that his HCV medication has been approved by [facility]  and to expect further communication from [facility].

## 2024-04-02 DIAGNOSIS — B17.10 ACUTE HEPATITIS C VIRUS INFECTION WITHOUT HEPATIC COMA: ICD-10-CM

## 2024-04-02 RX ORDER — VELPATASVIR AND SOFOSBUVIR 100; 400 MG/1; MG/1
1 TABLET, FILM COATED ORAL DAILY
Qty: 84 TABLET | Refills: 0 | Status: SHIPPED | OUTPATIENT
Start: 2024-04-02 | End: 2024-04-05 | Stop reason: SDUPTHER

## 2024-04-05 RX ORDER — VELPATASVIR AND SOFOSBUVIR 100; 400 MG/1; MG/1
1 TABLET, FILM COATED ORAL DAILY
Qty: 84 TABLET | Refills: 0 | Status: SHIPPED | OUTPATIENT
Start: 2024-04-05 | End: 2024-06-28

## 2024-04-12 DIAGNOSIS — F90.0 ATTENTION DEFICIT HYPERACTIVITY DISORDER (ADHD), PREDOMINANTLY INATTENTIVE TYPE: ICD-10-CM

## 2024-04-15 ENCOUNTER — SPECIALTY PHARMACY (OUTPATIENT)
Dept: PHARMACY | Facility: CLINIC | Age: 29
End: 2024-04-15

## 2024-04-15 DIAGNOSIS — B18.2 CHRONIC HEPATITIS C WITHOUT HEPATIC COMA (MULTI): Primary | ICD-10-CM

## 2024-04-15 RX ORDER — DEXTROAMPHETAMINE SACCHARATE, AMPHETAMINE ASPARTATE MONOHYDRATE, DEXTROAMPHETAMINE SULFATE AND AMPHETAMINE SULFATE 5; 5; 5; 5 MG/1; MG/1; MG/1; MG/1
20 CAPSULE, EXTENDED RELEASE ORAL EVERY MORNING
Qty: 30 CAPSULE | Refills: 0 | Status: SHIPPED | OUTPATIENT
Start: 2024-04-15 | End: 2024-05-14 | Stop reason: SDUPTHER

## 2024-04-15 NOTE — PROGRESS NOTES
Medication start date: 4/15/24  Therapy completion: 7/8/24  Anticipated SVR date: 9/30/24      Lab Results   Component Value Date    HCVPCRQUANT 1,710,000 (H) 03/08/2024    HCVGENO Genotype 1a 03/08/2024    AST 41 (H) 03/08/2024    ALT 57 (H) 03/08/2024    ALKPHOS 55 03/08/2024

## 2024-04-16 ENCOUNTER — OFFICE VISIT (OUTPATIENT)
Dept: PRIMARY CARE | Facility: CLINIC | Age: 29
End: 2024-04-16
Payer: COMMERCIAL

## 2024-04-16 VITALS
OXYGEN SATURATION: 96 % | WEIGHT: 152.4 LBS | TEMPERATURE: 98.8 F | BODY MASS INDEX: 20.64 KG/M2 | HEART RATE: 102 BPM | SYSTOLIC BLOOD PRESSURE: 110 MMHG | HEIGHT: 72 IN | DIASTOLIC BLOOD PRESSURE: 50 MMHG

## 2024-04-16 DIAGNOSIS — F90.0 ATTENTION DEFICIT HYPERACTIVITY DISORDER (ADHD), PREDOMINANTLY INATTENTIVE TYPE: Primary | ICD-10-CM

## 2024-04-16 DIAGNOSIS — F41.1 GAD (GENERALIZED ANXIETY DISORDER): ICD-10-CM

## 2024-04-16 DIAGNOSIS — F17.210 CIGARETTE NICOTINE DEPENDENCE WITHOUT COMPLICATION: ICD-10-CM

## 2024-04-16 DIAGNOSIS — B17.10 ACUTE HEPATITIS C VIRUS INFECTION WITHOUT HEPATIC COMA: ICD-10-CM

## 2024-04-16 DIAGNOSIS — I83.93 ASYMPTOMATIC VARICOSE VEINS OF BOTH LOWER EXTREMITIES: ICD-10-CM

## 2024-04-16 PROCEDURE — 4004F PT TOBACCO SCREEN RCVD TLK: CPT | Performed by: FAMILY MEDICINE

## 2024-04-16 PROCEDURE — 99214 OFFICE O/P EST MOD 30 MIN: CPT | Performed by: FAMILY MEDICINE

## 2024-04-16 RX ORDER — DEXTROAMPHETAMINE SACCHARATE, AMPHETAMINE ASPARTATE, DEXTROAMPHETAMINE SULFATE AND AMPHETAMINE SULFATE 1.25; 1.25; 1.25; 1.25 MG/1; MG/1; MG/1; MG/1
5 TABLET ORAL DAILY
Qty: 30 TABLET | Refills: 0 | Status: SHIPPED | OUTPATIENT
Start: 2024-04-16 | End: 2024-05-14 | Stop reason: SDUPTHER

## 2024-04-16 RX ORDER — NICOTINE 7MG/24HR
1 PATCH, TRANSDERMAL 24 HOURS TRANSDERMAL EVERY 24 HOURS
Qty: 30 PATCH | Refills: 0 | Status: SHIPPED | OUTPATIENT
Start: 2024-06-11 | End: 2024-07-11

## 2024-04-16 RX ORDER — IBUPROFEN 200 MG
1 TABLET ORAL EVERY 24 HOURS
Qty: 30 PATCH | Refills: 0 | Status: SHIPPED | OUTPATIENT
Start: 2024-04-16 | End: 2024-05-16

## 2024-04-16 RX ORDER — IBUPROFEN 200 MG
1 TABLET ORAL EVERY 24 HOURS
Qty: 30 PATCH | Refills: 0 | Status: SHIPPED | OUTPATIENT
Start: 2024-05-14 | End: 2024-06-13

## 2024-04-16 ASSESSMENT — PATIENT HEALTH QUESTIONNAIRE - PHQ9
2. FEELING DOWN, DEPRESSED OR HOPELESS: NOT AT ALL
1. LITTLE INTEREST OR PLEASURE IN DOING THINGS: NOT AT ALL
SUM OF ALL RESPONSES TO PHQ9 QUESTIONS 1 AND 2: 0

## 2024-04-16 ASSESSMENT — ENCOUNTER SYMPTOMS
NUMBNESS: 0
HYPERACTIVE: 0
DIZZINESS: 0
NERVOUS/ANXIOUS: 0
CHEST TIGHTNESS: 0
FEVER: 0
TREMORS: 0
DYSPHORIC MOOD: 0
HEADACHES: 0
SHORTNESS OF BREATH: 0
CHILLS: 0
CONFUSION: 0
PALPITATIONS: 0
FATIGUE: 0

## 2024-04-16 ASSESSMENT — PAIN SCALES - GENERAL: PAINLEVEL: 0-NO PAIN

## 2024-04-16 ASSESSMENT — LIFESTYLE VARIABLES: HOW OFTEN DO YOU HAVE A DRINK CONTAINING ALCOHOL: NEVER

## 2024-04-16 NOTE — PROGRESS NOTES
Subjective   Patient ID: Chris Perkins is a 29 y.o. male who presents for Follow-up.    HPI here for follow up anxiety and add. Notes sx worse in evening on occasion. Would like to quit smoking now as well.   Also would like to see about varicose vein eval and treatment. They are increasing in size but not painful. He is on his feet all day.      Review of Systems   Constitutional:  Negative for chills, fatigue and fever.   Respiratory:  Negative for chest tightness and shortness of breath.    Cardiovascular:  Negative for chest pain and palpitations.   Neurological:  Negative for dizziness, tremors, syncope, numbness and headaches.   Psychiatric/Behavioral:  Negative for confusion and dysphoric mood. The patient is not nervous/anxious and is not hyperactive.        Objective   /50   Pulse 102   Temp 37.1 °C (98.8 °F)   Ht 1.829 m (6')   Wt 69.1 kg (152 lb 6.4 oz)   SpO2 96%   BMI 20.67 kg/m²     Physical Exam  Constitutional:       Appearance: Normal appearance. He is normal weight.   Neurological:      Mental Status: He is alert.   Psychiatric:         Attention and Perception: Attention and perception normal. He does not perceive auditory or visual hallucinations.         Mood and Affect: Mood is not anxious or depressed. Affect is not labile, blunt, flat, angry or tearful.         Speech: Speech normal. He is communicative.         Behavior: Behavior normal. Behavior is not agitated or hyperactive.         Thought Content: Thought content normal. Thought content is not paranoid or delusional. Thought content does not include homicidal or suicidal ideation.         Cognition and Memory: Cognition and memory normal.         Judgment: Judgment normal.         Assessment/Plan   Problem List Items Addressed This Visit             ICD-10-CM    Cigarette nicotine dependence without complication F17.210    KELSEY (generalized anxiety disorder) F41.1    Attention deficit hyperactivity disorder (ADHD),  predominantly inattentive type - Primary F90.0     Add low dose adderall ir in pm prn to adderall xr in am, continue klonopin and add nicoderm patches- 21mg for a month then 14mg for a month then 7mg for a month      Refer to vein specialist for eval/tx of varicose veins

## 2024-05-14 ENCOUNTER — CLINICAL SUPPORT (OUTPATIENT)
Dept: PRIMARY CARE | Facility: CLINIC | Age: 29
End: 2024-05-14
Payer: COMMERCIAL

## 2024-05-14 DIAGNOSIS — F90.0 ATTENTION DEFICIT HYPERACTIVITY DISORDER (ADHD), PREDOMINANTLY INATTENTIVE TYPE: ICD-10-CM

## 2024-05-14 DIAGNOSIS — B17.10 ACUTE HEPATITIS C VIRUS INFECTION WITHOUT HEPATIC COMA: ICD-10-CM

## 2024-05-14 DIAGNOSIS — B18.2 CHRONIC HEPATITIS C WITHOUT HEPATIC COMA (MULTI): ICD-10-CM

## 2024-05-14 PROCEDURE — 36415 COLL VENOUS BLD VENIPUNCTURE: CPT

## 2024-05-14 RX ORDER — DEXTROAMPHETAMINE SACCHARATE, AMPHETAMINE ASPARTATE, DEXTROAMPHETAMINE SULFATE AND AMPHETAMINE SULFATE 1.25; 1.25; 1.25; 1.25 MG/1; MG/1; MG/1; MG/1
5 TABLET ORAL DAILY
Qty: 30 TABLET | Refills: 0 | Status: SHIPPED | OUTPATIENT
Start: 2024-05-14 | End: 2024-05-30 | Stop reason: SINTOL

## 2024-05-14 RX ORDER — DEXTROAMPHETAMINE SACCHARATE, AMPHETAMINE ASPARTATE MONOHYDRATE, DEXTROAMPHETAMINE SULFATE AND AMPHETAMINE SULFATE 5; 5; 5; 5 MG/1; MG/1; MG/1; MG/1
20 CAPSULE, EXTENDED RELEASE ORAL EVERY MORNING
Qty: 30 CAPSULE | Refills: 0 | Status: SHIPPED | OUTPATIENT
Start: 2024-05-14 | End: 2024-05-30 | Stop reason: ALTCHOICE

## 2024-05-20 ENCOUNTER — SPECIALTY PHARMACY (OUTPATIENT)
Dept: PHARMACY | Facility: CLINIC | Age: 29
End: 2024-05-20

## 2024-05-20 NOTE — PROGRESS NOTES
The patient was called today to follow up mid-HCV treatment. He stated that he has been taking his medication daily, though he is experiencing headache and lethargy. He denies having started any new medications. He was advised to complete the required labwork. He verbalized understanding.    Patient states he is having a copay issue; he received a bill for almost $8,000. Advised that he call [employer] support to ensure that the copay card was applied. Also, sent an e-mail to rep Lisa, to ask that she look into it.

## 2024-05-30 ENCOUNTER — OFFICE VISIT (OUTPATIENT)
Dept: PRIMARY CARE | Facility: CLINIC | Age: 29
End: 2024-05-30
Payer: COMMERCIAL

## 2024-05-30 VITALS
OXYGEN SATURATION: 97 % | HEIGHT: 72 IN | HEART RATE: 86 BPM | TEMPERATURE: 98.1 F | DIASTOLIC BLOOD PRESSURE: 64 MMHG | SYSTOLIC BLOOD PRESSURE: 108 MMHG | WEIGHT: 146.8 LBS | BODY MASS INDEX: 19.88 KG/M2

## 2024-05-30 DIAGNOSIS — F41.1 GAD (GENERALIZED ANXIETY DISORDER): ICD-10-CM

## 2024-05-30 DIAGNOSIS — K59.01 SLOW TRANSIT CONSTIPATION: ICD-10-CM

## 2024-05-30 DIAGNOSIS — F90.0 ATTENTION DEFICIT HYPERACTIVITY DISORDER (ADHD), PREDOMINANTLY INATTENTIVE TYPE: Primary | ICD-10-CM

## 2024-05-30 PROCEDURE — 4004F PT TOBACCO SCREEN RCVD TLK: CPT | Performed by: FAMILY MEDICINE

## 2024-05-30 PROCEDURE — 99214 OFFICE O/P EST MOD 30 MIN: CPT | Performed by: FAMILY MEDICINE

## 2024-05-30 RX ORDER — DEXTROAMPHETAMINE SACCHARATE, AMPHETAMINE ASPARTATE MONOHYDRATE, DEXTROAMPHETAMINE SULFATE AND AMPHETAMINE SULFATE 7.5; 7.5; 7.5; 7.5 MG/1; MG/1; MG/1; MG/1
30 CAPSULE, EXTENDED RELEASE ORAL EVERY MORNING
Qty: 30 CAPSULE | Refills: 0 | Status: SHIPPED | OUTPATIENT
Start: 2024-05-30 | End: 2024-06-29

## 2024-05-30 RX ORDER — DEXTROAMPHETAMINE SACCHARATE, AMPHETAMINE ASPARTATE MONOHYDRATE, DEXTROAMPHETAMINE SULFATE AND AMPHETAMINE SULFATE 7.5; 7.5; 7.5; 7.5 MG/1; MG/1; MG/1; MG/1
30 CAPSULE, EXTENDED RELEASE ORAL EVERY MORNING
Qty: 30 CAPSULE | Refills: 0 | Status: SHIPPED | OUTPATIENT
Start: 2024-06-29 | End: 2024-07-29

## 2024-05-30 RX ORDER — DEXTROAMPHETAMINE SACCHARATE, AMPHETAMINE ASPARTATE MONOHYDRATE, DEXTROAMPHETAMINE SULFATE AND AMPHETAMINE SULFATE 7.5; 7.5; 7.5; 7.5 MG/1; MG/1; MG/1; MG/1
30 CAPSULE, EXTENDED RELEASE ORAL EVERY MORNING
Qty: 30 CAPSULE | Refills: 0 | Status: SHIPPED | OUTPATIENT
Start: 2024-07-29 | End: 2024-08-28

## 2024-05-30 RX ORDER — AMOXICILLIN 250 MG
2 CAPSULE ORAL 2 TIMES DAILY
Qty: 120 TABLET | Refills: 11 | Status: SHIPPED | OUTPATIENT
Start: 2024-05-30 | End: 2025-05-30

## 2024-05-30 RX ORDER — CLONAZEPAM 0.5 MG/1
1 TABLET ORAL NIGHTLY
Qty: 60 TABLET | Refills: 2 | Status: SHIPPED | OUTPATIENT
Start: 2024-05-30 | End: 2024-08-28

## 2024-05-30 ASSESSMENT — PATIENT HEALTH QUESTIONNAIRE - PHQ9
2. FEELING DOWN, DEPRESSED OR HOPELESS: NOT AT ALL
SUM OF ALL RESPONSES TO PHQ9 QUESTIONS 1 AND 2: 0
1. LITTLE INTEREST OR PLEASURE IN DOING THINGS: NOT AT ALL

## 2024-05-30 ASSESSMENT — PAIN SCALES - GENERAL: PAINLEVEL: 0-NO PAIN

## 2024-05-30 ASSESSMENT — ENCOUNTER SYMPTOMS
CHILLS: 0
DYSPHORIC MOOD: 0
HEADACHES: 0
CONFUSION: 0
NUMBNESS: 0
FEVER: 0
VOMITING: 0
BLOOD IN STOOL: 1
NAUSEA: 0
HYPERACTIVE: 0
TREMORS: 0
CONSTIPATION: 1
DIZZINESS: 0
ABDOMINAL PAIN: 0
SHORTNESS OF BREATH: 0
CHEST TIGHTNESS: 0
PALPITATIONS: 0
FATIGUE: 0
NERVOUS/ANXIOUS: 0

## 2024-05-30 ASSESSMENT — LIFESTYLE VARIABLES: HOW OFTEN DO YOU HAVE A DRINK CONTAINING ALCOHOL: NEVER

## 2024-05-30 NOTE — PROGRESS NOTES
Subjective   Patient ID: Chris Perkins is a 29 y.o. male who presents for Follow-up.    HPI low dose of ritalin made him to irritable. Would like to increase xr if possible. No cardiac issues or symptoms noted.       Review of Systems   Constitutional:  Negative for chills, fatigue and fever.   Respiratory:  Negative for chest tightness and shortness of breath.    Cardiovascular:  Negative for chest pain and palpitations.   Gastrointestinal:  Positive for blood in stool and constipation. Negative for abdominal pain, nausea and vomiting.   Neurological:  Negative for dizziness, tremors, syncope, numbness and headaches.   Psychiatric/Behavioral:  Negative for confusion and dysphoric mood. The patient is not nervous/anxious and is not hyperactive.        Objective   /64   Pulse 86   Temp 36.7 °C (98.1 °F)   Ht 1.829 m (6')   Wt 66.6 kg (146 lb 12.8 oz)   SpO2 97%   BMI 19.91 kg/m²     Physical Exam  Constitutional:       Appearance: Normal appearance. He is normal weight.   Neurological:      Mental Status: He is alert.   Psychiatric:         Attention and Perception: Attention and perception normal. He does not perceive auditory or visual hallucinations.         Mood and Affect: Mood is not anxious or depressed. Affect is not labile, blunt, flat, angry or tearful.         Speech: Speech normal. He is communicative.         Behavior: Behavior normal. Behavior is not agitated or hyperactive.         Thought Content: Thought content normal. Thought content is not paranoid or delusional. Thought content does not include homicidal or suicidal ideation.         Cognition and Memory: Cognition and memory normal.         Judgment: Judgment normal.         Assessment/Plan   Problem List Items Addressed This Visit             ICD-10-CM    KELSEY (generalized anxiety disorder) F41.1    Relevant Medications    clonazePAM (KlonoPIN) 0.5 mg tablet    Attention deficit hyperactivity disorder (ADHD), predominantly  inattentive type - Primary F90.0    Relevant Medications    amphetamine-dextroamphetamine XR (Adderall XR) 30 mg 24 hr capsule    amphetamine-dextroamphetamine XR (Adderall XR) 30 mg 24 hr capsule (Start on 6/29/2024)    amphetamine-dextroamphetamine XR (Adderall XR) 30 mg 24 hr capsule (Start on 7/29/2024)     Other Visit Diagnoses         Codes    Slow transit constipation     K59.01    Relevant Medications    sennosides-docusate sodium (Dennise-Colace) 8.6-50 mg tablet             Increase adderall xr to 30 mg a day  Refill klonopin  Add senna s 2 a day

## 2024-06-25 DIAGNOSIS — F90.0 ATTENTION DEFICIT HYPERACTIVITY DISORDER (ADHD), PREDOMINANTLY INATTENTIVE TYPE: Primary | ICD-10-CM

## 2024-06-25 RX ORDER — DEXTROAMPHETAMINE SACCHARATE, AMPHETAMINE ASPARTATE MONOHYDRATE, DEXTROAMPHETAMINE SULFATE AND AMPHETAMINE SULFATE 7.5; 7.5; 7.5; 7.5 MG/1; MG/1; MG/1; MG/1
30 CAPSULE, EXTENDED RELEASE ORAL EVERY MORNING
Qty: 30 CAPSULE | Refills: 0 | Status: SHIPPED | OUTPATIENT
Start: 2024-06-25 | End: 2024-07-25

## 2024-07-08 ENCOUNTER — LAB (OUTPATIENT)
Dept: LAB | Facility: LAB | Age: 29
End: 2024-07-08
Payer: COMMERCIAL

## 2024-07-08 ENCOUNTER — APPOINTMENT (OUTPATIENT)
Dept: ORTHOPEDIC SURGERY | Facility: CLINIC | Age: 29
End: 2024-07-08
Payer: COMMERCIAL

## 2024-07-08 DIAGNOSIS — G56.03 BILATERAL CARPAL TUNNEL SYNDROME: Primary | ICD-10-CM

## 2024-07-08 DIAGNOSIS — R20.2 NUMBNESS AND TINGLING IN BOTH HANDS: ICD-10-CM

## 2024-07-08 DIAGNOSIS — R20.0 NUMBNESS AND TINGLING IN BOTH HANDS: ICD-10-CM

## 2024-07-08 DIAGNOSIS — B18.2 CHRONIC HEPATITIS C WITHOUT HEPATIC COMA (MULTI): ICD-10-CM

## 2024-07-08 LAB
ALBUMIN SERPL BCP-MCNC: 4.4 G/DL (ref 3.4–5)
ALP SERPL-CCNC: 49 U/L (ref 33–120)
ALT SERPL W P-5'-P-CCNC: 17 U/L (ref 10–52)
AST SERPL W P-5'-P-CCNC: 24 U/L (ref 9–39)
BILIRUB DIRECT SERPL-MCNC: 0.1 MG/DL (ref 0–0.3)
BILIRUB SERPL-MCNC: 0.3 MG/DL (ref 0–1.2)
PROT SERPL-MCNC: 6.9 G/DL (ref 6.4–8.2)

## 2024-07-08 PROCEDURE — 36415 COLL VENOUS BLD VENIPUNCTURE: CPT

## 2024-07-08 PROCEDURE — 87522 HEPATITIS C REVRS TRNSCRPJ: CPT

## 2024-07-08 PROCEDURE — 4004F PT TOBACCO SCREEN RCVD TLK: CPT | Performed by: ORTHOPAEDIC SURGERY

## 2024-07-08 PROCEDURE — 80076 HEPATIC FUNCTION PANEL: CPT

## 2024-07-08 PROCEDURE — 99203 OFFICE O/P NEW LOW 30 MIN: CPT | Performed by: ORTHOPAEDIC SURGERY

## 2024-07-08 ASSESSMENT — PAIN SCALES - GENERAL: PAINLEVEL_OUTOF10: 7

## 2024-07-08 ASSESSMENT — PAIN - FUNCTIONAL ASSESSMENT: PAIN_FUNCTIONAL_ASSESSMENT: 0-10

## 2024-07-08 NOTE — PROGRESS NOTES
History of Present Illness:  Chief Complaint   Patient presents with    Left Hand - Numbness    Right Hand - Numbness       The patient presents today for evaluation of bilateral hand pain.  The patient endorses numbness and tingling (predominantly radial three digits).  The patient has tried to the following interventions thus far:  Bracing with some improvement of symptoms, but remains very symptomatic .  The patient notes the pain is worse with elevated hand activities and at night.  The patient denies any recent trauma.  The pain is sharp, electrical in nature.  Symptoms began about 6 years ago, but significantly worse more recently.  No previous EMG.      Past Medical History:   Diagnosis Date    Anxiety     Asthma, acute ([facility])     Hepatitis C        Medication Documentation Review Audit       Reviewed by Janice Turner CMA (Medical Assistant) on 07/08/24 at 1008      Medication Order Taking? Sig Documenting Provider Last Dose Status   albuterol 90 mcg/actuation inhaler [id number] No every 4 hours. Historical Provider, MD Taking Active   amphetamine-dextroamphetamine XR (Adderall XR) 30 mg 24 hr capsule [id number]  Take 1 capsule (30 mg) by mouth once daily in the morning. Do not crush or chew. Do not fill before June 29, 2024. Lana Rabago MD  Active   amphetamine-dextroamphetamine XR (Adderall XR) 30 mg 24 hr capsule [id number]  Take 1 capsule (30 mg) by mouth once daily in the morning. Do not crush or chew. Do not fill before July 29, 2024. Lana Rabago MD  Active   amphetamine-dextroamphetamine XR (Adderall XR) 30 mg 24 hr capsule [id number]  Take 1 capsule (30 mg) by mouth once daily in the morning. Do not crush or chew. Lana Rabago MD  Active   clonazePAM (KlonoPIN) 0.5 mg tablet [id number]  Take 2 tablets (1 mg) by mouth once daily at bedtime. Lana Rabago MD  Active   nicotine (Nicoderm CQ) 14 mg/24 hr patch [id number] No Place 1 patch over 24 hours on  the skin once every 24 hours. Do not start before May 14, 2024. Lana Rabago MD Taking  24 2359   nicotine (Nicoderm CQ) 21 mg/24 hr patch [id number]  Place 1 patch over 24 hours on the skin once every 24 hours. Lana Rabago MD   24 235   nicotine (Nicoderm CQ) 7 mg/24 hr patch [id number] No Place 1 patch over 24 hours on the skin once every 24 hours. Do not start before 2024. Lana Rabago MD Taking Active   sennosides-docusate sodium (Dennise-Colace) 8.6-50 mg tablet [id number]  Take 2 tablets by mouth 2 times a day. Lana Rabago MD  Active                    No Known Allergies    Social History     Socioeconomic History    Marital status: Single     Spouse name: Not on file    Number of children: Not on file    Years of education: Not on file    Highest education level: Not on file   Occupational History    Not on file   Tobacco Use    Smoking status: Every Day     Current packs/day: 0.50     Average packs/day: 0.5 packs/day for 15.0 years (7.5 ttl pk-yrs)     Types: Cigarettes    Smokeless tobacco: Never   Vaping Use    Vaping status: Never Used   Substance and Sexual Activity    Alcohol use: Never    Drug use: Yes     Types: Marijuana, Benzodiazepines     Comment: medical marijuana card,    Sexual activity: Not on file   Other Topics Concern    Not on file   Social History Narrative    Not on file     Social Determinants of Health     Financial Resource Strain: Not on file   Food Insecurity: Not on file   Transportation Needs: Not on file   Physical Activity: Not on file   Stress: Not on file   Social Connections: Not on file   Intimate Partner Violence: Not on file   Housing Stability: Not on file       Past Surgical History:   Procedure Laterality Date    MOUTH SURGERY            Review of Systems   GENERAL: Negative for malaise, significant weight loss, fever  MUSCULOSKELETAL: see HPI  NEURO:  see HPI     Physical  Examination:  Constitutional: Appears well-developed and well-nourished.  Head: Normocephalic and atraumatic.  Eyes: EOMI grossly  Cardiovascular: Intact distal pulses.   Respiratory: Effort normal. No respiratory distress.  Neurologic: Alert and oriented to person, place, and time.  Skin: Skin is warm and dry.  Hematologic / Lymphatic: No lymphedema, lymphangitis.  Psychiatric: normal mood and affect. Behavior is normal.   Musculoskeletal:  bilateral wrist/hand:  No obvious swelling or masses  No appreciable thenar atrophy  No atrophy noted of hypothenar eminence   Positive Tinel's at carpal tunnel  + Median nerve compression test  + Rk's test  Decreased sensation to light touch in median nerve distribution  5/5 thumb Abduction, 5/5 Finger Abduction  Radial pulse palpable  Good capillary refill        Assessment:  Patient with bilateral carpal tunnel syndrome     Plan:  We reviewed the disease process with the patient.  We discussed the role for electrodiagnostic testing and treatment decision making, immobilization, and injections.  We discussed surgical intervention reviewing the risks (neurologic injury, wound issues including infection, pillar pain, and recurrence) and benefits.  EMG has been ordered to further evaluate and establish baseline.  Plan for telephone review after completion of the EMG to discuss next steps.  We did discuss recommendation to address carpal tunnel syndrome before progresses to the point where he is having constant symptoms so that we can hopefully avoid permanent nerve injury.

## 2024-07-09 LAB
HCV RNA SERPL NAA+PROBE-ACNC: NOT DETECTED K[IU]/ML
HCV RNA SERPL NAA+PROBE-LOG IU: NORMAL {LOG_IU}/ML

## 2024-07-23 ENCOUNTER — TELEPHONE (OUTPATIENT)
Dept: PRIMARY CARE | Facility: CLINIC | Age: 29
End: 2024-07-23
Payer: COMMERCIAL

## 2024-07-23 DIAGNOSIS — F90.0 ATTENTION DEFICIT HYPERACTIVITY DISORDER (ADHD), PREDOMINANTLY INATTENTIVE TYPE: Primary | ICD-10-CM

## 2024-07-23 RX ORDER — DEXTROAMPHETAMINE SACCHARATE, AMPHETAMINE ASPARTATE, DEXTROAMPHETAMINE SULFATE AND AMPHETAMINE SULFATE 1.25; 1.25; 1.25; 1.25 MG/1; MG/1; MG/1; MG/1
5 TABLET ORAL DAILY
Qty: 30 TABLET | Refills: 0 | Status: SHIPPED | OUTPATIENT
Start: 2024-07-23 | End: 2024-08-22

## 2024-07-23 RX ORDER — DEXTROAMPHETAMINE SACCHARATE, AMPHETAMINE ASPARTATE MONOHYDRATE, DEXTROAMPHETAMINE SULFATE AND AMPHETAMINE SULFATE 5; 5; 5; 5 MG/1; MG/1; MG/1; MG/1
20 CAPSULE, EXTENDED RELEASE ORAL EVERY MORNING
Qty: 30 CAPSULE | Refills: 0 | Status: SHIPPED | OUTPATIENT
Start: 2024-07-23 | End: 2024-08-22

## 2024-07-23 NOTE — TELEPHONE ENCOUNTER
Pt called office and stated that it was discussed at his appointment with provider that he can go back to a lower dose of adderall.  He is asking to go back on the adderall 20mg capsules and adderall 5mg instant release tablets. Please send to [facility] in [place].

## 2024-07-25 ENCOUNTER — SPECIALTY PHARMACY (OUTPATIENT)
Dept: PHARMACY | Facility: CLINIC | Age: 29
End: 2024-07-25

## 2024-07-25 NOTE — PROGRESS NOTES
I spoke to this patient on 7/25/2024 to inquire if he had completed his HCV regimen. He stated that he did complete it. He stated that he did experience some headaches while on therapy and he did miss 5-6 doses. He was informed that his HCV-RNA was not detected. He was reminded to complete SVR labs in October and follow up with the office.

## 2024-08-22 DIAGNOSIS — F90.0 ATTENTION DEFICIT HYPERACTIVITY DISORDER (ADHD), PREDOMINANTLY INATTENTIVE TYPE: ICD-10-CM

## 2024-08-22 RX ORDER — DEXTROAMPHETAMINE SACCHARATE, AMPHETAMINE ASPARTATE MONOHYDRATE, DEXTROAMPHETAMINE SULFATE AND AMPHETAMINE SULFATE 5; 5; 5; 5 MG/1; MG/1; MG/1; MG/1
20 CAPSULE, EXTENDED RELEASE ORAL EVERY MORNING
Qty: 30 CAPSULE | Refills: 0 | Status: SHIPPED | OUTPATIENT
Start: 2024-08-22 | End: 2024-09-21

## 2024-08-22 RX ORDER — DEXTROAMPHETAMINE SACCHARATE, AMPHETAMINE ASPARTATE, DEXTROAMPHETAMINE SULFATE AND AMPHETAMINE SULFATE 1.25; 1.25; 1.25; 1.25 MG/1; MG/1; MG/1; MG/1
5 TABLET ORAL DAILY
Qty: 30 TABLET | Refills: 0 | Status: SHIPPED | OUTPATIENT
Start: 2024-08-22 | End: 2024-09-21

## 2024-08-23 DIAGNOSIS — F90.0 ATTENTION DEFICIT HYPERACTIVITY DISORDER (ADHD), PREDOMINANTLY INATTENTIVE TYPE: ICD-10-CM

## 2024-08-23 RX ORDER — MELOXICAM 7.5 MG/1
1 TABLET ORAL
COMMUNITY
Start: 2023-09-02 | End: 2024-08-29 | Stop reason: ALTCHOICE

## 2024-08-23 RX ORDER — HYDROCODONE BITARTRATE AND ACETAMINOPHEN 5; 325 MG/1; MG/1
1 TABLET ORAL EVERY 4 HOURS PRN
COMMUNITY
Start: 2023-10-26 | End: 2024-08-29 | Stop reason: ALTCHOICE

## 2024-08-23 RX ORDER — ACETAMINOPHEN AND CODEINE PHOSPHATE 300; 30 MG/1; MG/1
1 TABLET ORAL EVERY 6 HOURS
COMMUNITY
Start: 2023-10-27 | End: 2024-08-29 | Stop reason: ALTCHOICE

## 2024-08-23 NOTE — TELEPHONE ENCOUNTER
"LOV: 24    Upcomin24  Patient wanted you to be aware [facility], [facility], [facility] in [place] and [facility] are all on back order of this medication as they \"use the same supplier.\" Please send to [facility].   "

## 2024-08-26 RX ORDER — DEXTROAMPHETAMINE SACCHARATE, AMPHETAMINE ASPARTATE MONOHYDRATE, DEXTROAMPHETAMINE SULFATE AND AMPHETAMINE SULFATE 5; 5; 5; 5 MG/1; MG/1; MG/1; MG/1
20 CAPSULE, EXTENDED RELEASE ORAL EVERY MORNING
Qty: 30 CAPSULE | Refills: 0 | Status: SHIPPED | OUTPATIENT
Start: 2024-08-26 | End: 2024-09-25

## 2024-08-29 ENCOUNTER — APPOINTMENT (OUTPATIENT)
Dept: PRIMARY CARE | Facility: CLINIC | Age: 29
End: 2024-08-29
Payer: COMMERCIAL

## 2024-08-29 VITALS
SYSTOLIC BLOOD PRESSURE: 100 MMHG | HEART RATE: 84 BPM | DIASTOLIC BLOOD PRESSURE: 56 MMHG | WEIGHT: 149.8 LBS | TEMPERATURE: 97.1 F | OXYGEN SATURATION: 97 % | HEIGHT: 72 IN | BODY MASS INDEX: 20.29 KG/M2

## 2024-08-29 DIAGNOSIS — F41.1 GAD (GENERALIZED ANXIETY DISORDER): ICD-10-CM

## 2024-08-29 DIAGNOSIS — F90.0 ATTENTION DEFICIT HYPERACTIVITY DISORDER (ADHD), PREDOMINANTLY INATTENTIVE TYPE: Primary | ICD-10-CM

## 2024-08-29 DIAGNOSIS — F17.200 TOBACCO DEPENDENCE: ICD-10-CM

## 2024-08-29 DIAGNOSIS — M65.4 DE QUERVAIN'S TENOSYNOVITIS, RIGHT: ICD-10-CM

## 2024-08-29 DIAGNOSIS — J45.30 MILD PERSISTENT ASTHMA WITHOUT COMPLICATION (HHS-HCC): ICD-10-CM

## 2024-08-29 PROBLEM — B17.10 ACUTE HEPATITIS C VIRUS INFECTION: Status: RESOLVED | Noted: 2022-12-20 | Resolved: 2024-08-29

## 2024-08-29 PROBLEM — R74.01 ELEVATION OF LEVELS OF LIVER TRANSAMINASE LEVELS: Status: RESOLVED | Noted: 2024-02-06 | Resolved: 2024-08-29

## 2024-08-29 PROBLEM — J40 BRONCHITIS: Status: RESOLVED | Noted: 2024-03-18 | Resolved: 2024-08-29

## 2024-08-29 PROBLEM — T78.40XA ALLERGIC REACTION: Status: RESOLVED | Noted: 2024-03-18 | Resolved: 2024-08-29

## 2024-08-29 PROCEDURE — 99214 OFFICE O/P EST MOD 30 MIN: CPT | Performed by: FAMILY MEDICINE

## 2024-08-29 PROCEDURE — 3008F BODY MASS INDEX DOCD: CPT | Performed by: FAMILY MEDICINE

## 2024-08-29 PROCEDURE — 4004F PT TOBACCO SCREEN RCVD TLK: CPT | Performed by: FAMILY MEDICINE

## 2024-08-29 RX ORDER — PREDNISONE 50 MG/1
50 TABLET ORAL DAILY
Qty: 5 TABLET | Refills: 0 | Status: SHIPPED | OUTPATIENT
Start: 2024-08-29 | End: 2024-09-03

## 2024-08-29 RX ORDER — VARENICLINE TARTRATE 1 MG/1
1 TABLET, FILM COATED ORAL 2 TIMES DAILY
Qty: 60 TABLET | Refills: 3 | Status: SHIPPED | OUTPATIENT
Start: 2024-09-26 | End: 2024-12-25

## 2024-08-29 RX ORDER — VARENICLINE TARTRATE 0.5 MG/1
0.5 TABLET, FILM COATED ORAL 2 TIMES DAILY
Qty: 60 TABLET | Refills: 0 | Status: SHIPPED | OUTPATIENT
Start: 2024-08-29 | End: 2024-09-28

## 2024-08-29 RX ORDER — CLONAZEPAM 0.5 MG/1
1 TABLET ORAL NIGHTLY
Qty: 60 TABLET | Refills: 2 | Status: SHIPPED | OUTPATIENT
Start: 2024-08-29 | End: 2024-11-27

## 2024-08-29 ASSESSMENT — ENCOUNTER SYMPTOMS
ABDOMINAL PAIN: 0
CONSTIPATION: 0
NUMBNESS: 1
FATIGUE: 0
CHILLS: 0
SHORTNESS OF BREATH: 0
DYSURIA: 0
DIARRHEA: 0
NAUSEA: 0
HEADACHES: 0
ARTHRALGIAS: 0
SORE THROAT: 0
COUGH: 0
FEVER: 0
VOMITING: 0
JOINT SWELLING: 0

## 2024-08-29 ASSESSMENT — LIFESTYLE VARIABLES: HOW OFTEN DO YOU HAVE A DRINK CONTAINING ALCOHOL: NEVER

## 2024-08-29 ASSESSMENT — PATIENT HEALTH QUESTIONNAIRE - PHQ9
1. LITTLE INTEREST OR PLEASURE IN DOING THINGS: NOT AT ALL
2. FEELING DOWN, DEPRESSED OR HOPELESS: NOT AT ALL
SUM OF ALL RESPONSES TO PHQ9 QUESTIONS 1 AND 2: 0

## 2024-08-29 ASSESSMENT — PAIN SCALES - GENERAL: PAINLEVEL: 8

## 2024-08-29 NOTE — PROGRESS NOTES
Subjective   Patient ID: Chris Perkins is a 29 y.o. male who presents for Follow-up.    HPI here for follow up add and alyssa- work stress should ease up next week with new help coming. Has numbness in all fingers now, needs to schedule emg and ncs from ortho.     Review of Systems   Constitutional:  Negative for chills, fatigue and fever.   HENT:  Negative for congestion and sore throat.    Eyes:  Negative for visual disturbance.   Respiratory:  Negative for cough and shortness of breath.    Cardiovascular:  Negative for chest pain.   Gastrointestinal:  Negative for abdominal pain, constipation, diarrhea, nausea and vomiting.   Genitourinary:  Negative for dysuria.   Musculoskeletal:  Negative for arthralgias and joint swelling.   Skin:  Negative for rash.   Neurological:  Positive for numbness. Negative for headaches.       Objective   /56   Pulse 84   Temp 36.2 °C (97.1 °F)   Ht 1.829 m (6')   Wt 67.9 kg (149 lb 12.8 oz)   SpO2 97%   BMI 20.32 kg/m²     Physical Exam  Constitutional:       General: He is not in acute distress.     Appearance: Normal appearance.   HENT:      Head: Normocephalic.      Mouth/Throat:      Mouth: Mucous membranes are moist.      Pharynx: Oropharynx is clear.   Eyes:      Extraocular Movements: Extraocular movements intact.      Pupils: Pupils are equal, round, and reactive to light.   Cardiovascular:      Rate and Rhythm: Normal rate and regular rhythm.      Heart sounds: Normal heart sounds.   Pulmonary:      Effort: Pulmonary effort is normal.      Breath sounds: Normal breath sounds. No wheezing or rhonchi.   Abdominal:      General: There is no distension.      Palpations: Abdomen is soft.      Tenderness: There is no abdominal tenderness.   Musculoskeletal:      Right lower leg: No edema.      Left lower leg: No edema.   Lymphadenopathy:      Cervical: No cervical adenopathy.   Skin:     Coloration: Skin is not jaundiced.   Neurological:      General: No focal deficit  present.      Mental Status: He is alert.      Cranial Nerves: No cranial nerve deficit.      Sensory: Sensory deficit present.   Psychiatric:         Mood and Affect: Mood normal.         Assessment/Plan   Problem List Items Addressed This Visit             ICD-10-CM    Mild persistent asthma ([facility]) J45.30    KELSEY (generalized anxiety disorder) F41.1    Attention deficit hyperactivity disorder (ADHD), predominantly inattentive type - Primary F90.0     Other Visit Diagnoses         Codes    Tobacco dependence     F17.200    Relevant Medications    varenicline (Chantix) 0.5 mg tablet    varenicline (Chantix) 1 mg tablet (Start on 9/26/2024)          Try chantix to quit smoking  Emg to be done  Follow up ortho   Kelsey-stable with work changes and use of nightly klonopin   Right cts vs de quervains- use thumb spica splint and prednisone

## 2024-09-23 DIAGNOSIS — F17.200 TOBACCO DEPENDENCE: ICD-10-CM

## 2024-09-23 DIAGNOSIS — F90.0 ATTENTION DEFICIT HYPERACTIVITY DISORDER (ADHD), PREDOMINANTLY INATTENTIVE TYPE: ICD-10-CM

## 2024-09-23 RX ORDER — DEXTROAMPHETAMINE SACCHARATE, AMPHETAMINE ASPARTATE MONOHYDRATE, DEXTROAMPHETAMINE SULFATE AND AMPHETAMINE SULFATE 5; 5; 5; 5 MG/1; MG/1; MG/1; MG/1
20 CAPSULE, EXTENDED RELEASE ORAL EVERY MORNING
Qty: 30 CAPSULE | Refills: 0 | Status: SHIPPED | OUTPATIENT
Start: 2024-09-23 | End: 2024-09-24 | Stop reason: SDUPTHER

## 2024-09-23 RX ORDER — VARENICLINE TARTRATE 0.5 MG/1
0.5 TABLET, FILM COATED ORAL 2 TIMES DAILY
Qty: 60 TABLET | Refills: 0 | Status: SHIPPED | OUTPATIENT
Start: 2024-09-23 | End: 2024-10-23

## 2024-09-23 RX ORDER — DEXTROAMPHETAMINE SACCHARATE, AMPHETAMINE ASPARTATE, DEXTROAMPHETAMINE SULFATE AND AMPHETAMINE SULFATE 1.25; 1.25; 1.25; 1.25 MG/1; MG/1; MG/1; MG/1
5 TABLET ORAL DAILY
Qty: 30 TABLET | Refills: 0 | Status: SHIPPED | OUTPATIENT
Start: 2024-09-23 | End: 2024-10-23

## 2024-09-23 RX ORDER — DEXTROAMPHETAMINE SACCHARATE, AMPHETAMINE ASPARTATE MONOHYDRATE, DEXTROAMPHETAMINE SULFATE AND AMPHETAMINE SULFATE 5; 5; 5; 5 MG/1; MG/1; MG/1; MG/1
20 CAPSULE, EXTENDED RELEASE ORAL EVERY MORNING
Qty: 30 CAPSULE | Refills: 0 | OUTPATIENT
Start: 2024-09-23 | End: 2024-10-23

## 2024-09-24 DIAGNOSIS — F90.0 ATTENTION DEFICIT HYPERACTIVITY DISORDER (ADHD), PREDOMINANTLY INATTENTIVE TYPE: ICD-10-CM

## 2024-09-24 RX ORDER — DEXTROAMPHETAMINE SACCHARATE, AMPHETAMINE ASPARTATE MONOHYDRATE, DEXTROAMPHETAMINE SULFATE AND AMPHETAMINE SULFATE 5; 5; 5; 5 MG/1; MG/1; MG/1; MG/1
20 CAPSULE, EXTENDED RELEASE ORAL EVERY MORNING
Qty: 30 CAPSULE | Refills: 0 | Status: SHIPPED | OUTPATIENT
Start: 2024-09-24 | End: 2024-10-24

## 2024-09-24 NOTE — TELEPHONE ENCOUNTER
LOV: 8/29/24      Patient asking if you could re-send Adderall 20 mg dose to [facility] as [facility]'t [facility] dose [facility] and didn't know when they would get a shipment of it.

## 2024-10-02 ENCOUNTER — TELEPHONE (OUTPATIENT)
Dept: PRIMARY CARE | Facility: CLINIC | Age: 29
End: 2024-10-02
Payer: COMMERCIAL

## 2024-10-02 DIAGNOSIS — F90.0 ATTENTION DEFICIT HYPERACTIVITY DISORDER (ADHD), PREDOMINANTLY INATTENTIVE TYPE: Primary | ICD-10-CM

## 2024-10-02 RX ORDER — LISDEXAMFETAMINE DIMESYLATE 30 MG/1
30 CAPSULE ORAL EVERY MORNING
Qty: 30 CAPSULE | Refills: 0 | Status: SHIPPED | OUTPATIENT
Start: 2024-12-01 | End: 2024-10-03 | Stop reason: SDUPTHER

## 2024-10-02 RX ORDER — LISDEXAMFETAMINE DIMESYLATE 30 MG/1
30 CAPSULE ORAL EVERY MORNING
Qty: 30 CAPSULE | Refills: 0 | Status: SHIPPED | OUTPATIENT
Start: 2024-11-01 | End: 2024-10-03 | Stop reason: SDUPTHER

## 2024-10-02 RX ORDER — LISDEXAMFETAMINE DIMESYLATE 30 MG/1
30 CAPSULE ORAL EVERY MORNING
Qty: 30 CAPSULE | Refills: 0 | Status: SHIPPED | OUTPATIENT
Start: 2024-10-02 | End: 2024-10-03 | Stop reason: SDUPTHER

## 2024-10-02 NOTE — TELEPHONE ENCOUNTER
Patient called to request a RX for Vyvanse [facility] in [place] as well as other pharmacies are out of Adderall.

## 2024-10-03 DIAGNOSIS — F90.0 ATTENTION DEFICIT HYPERACTIVITY DISORDER (ADHD), PREDOMINANTLY INATTENTIVE TYPE: ICD-10-CM

## 2024-10-03 RX ORDER — LISDEXAMFETAMINE DIMESYLATE 30 MG/1
30 CAPSULE ORAL EVERY MORNING
Qty: 30 CAPSULE | Refills: 0 | Status: SHIPPED | OUTPATIENT
Start: 2024-10-03 | End: 2024-11-02

## 2024-10-03 RX ORDER — LISDEXAMFETAMINE DIMESYLATE 30 MG/1
30 CAPSULE ORAL EVERY MORNING
Qty: 30 CAPSULE | Refills: 0 | Status: SHIPPED | OUTPATIENT
Start: 2024-12-01 | End: 2024-12-31

## 2024-10-03 RX ORDER — LISDEXAMFETAMINE DIMESYLATE 30 MG/1
30 CAPSULE ORAL EVERY MORNING
Qty: 30 CAPSULE | Refills: 0 | Status: SHIPPED | OUTPATIENT
Start: 2024-11-01 | End: 2024-12-01

## 2024-10-03 NOTE — TELEPHONE ENCOUNTER
PT CALLED OFFICE AND STATED THAT HIS VYVANSE WAS SENT TO  [facility] AND HE WANTED IT TO GO TO [facility] IN [place]. CAN YOU PLEASE RESEND TO [facility] IN [place].

## 2024-10-16 ENCOUNTER — TELEPHONE (OUTPATIENT)
Dept: PRIMARY CARE | Facility: CLINIC | Age: 29
End: 2024-10-16
Payer: COMMERCIAL

## 2024-10-16 DIAGNOSIS — J45.30 MILD PERSISTENT ASTHMA WITHOUT COMPLICATION (HHS-HCC): Primary | ICD-10-CM

## 2024-10-16 RX ORDER — BENZONATATE 100 MG/1
100 CAPSULE ORAL 3 TIMES DAILY PRN
Qty: 42 CAPSULE | Refills: 0 | Status: SHIPPED | OUTPATIENT
Start: 2024-10-16 | End: 2024-11-15

## 2024-10-16 RX ORDER — PREDNISONE 50 MG/1
50 TABLET ORAL DAILY
Qty: 5 TABLET | Refills: 0 | Status: SHIPPED | OUTPATIENT
Start: 2024-10-16 | End: 2024-10-21

## 2024-10-21 DIAGNOSIS — F90.0 ATTENTION DEFICIT HYPERACTIVITY DISORDER (ADHD), PREDOMINANTLY INATTENTIVE TYPE: Primary | ICD-10-CM

## 2024-10-21 RX ORDER — DEXTROAMPHETAMINE SACCHARATE, AMPHETAMINE ASPARTATE, DEXTROAMPHETAMINE SULFATE AND AMPHETAMINE SULFATE 1.25; 1.25; 1.25; 1.25 MG/1; MG/1; MG/1; MG/1
5 TABLET ORAL DAILY
Qty: 30 TABLET | Refills: 0 | Status: SHIPPED | OUTPATIENT
Start: 2024-10-21 | End: 2024-11-20

## 2024-10-29 ENCOUNTER — SPECIALTY PHARMACY (OUTPATIENT)
Dept: PHARMACY | Facility: CLINIC | Age: 29
End: 2024-10-29

## 2024-11-18 DIAGNOSIS — F90.0 ATTENTION DEFICIT HYPERACTIVITY DISORDER (ADHD), PREDOMINANTLY INATTENTIVE TYPE: Primary | ICD-10-CM

## 2024-11-18 RX ORDER — DEXTROAMPHETAMINE SACCHARATE, AMPHETAMINE ASPARTATE, DEXTROAMPHETAMINE SULFATE AND AMPHETAMINE SULFATE 1.25; 1.25; 1.25; 1.25 MG/1; MG/1; MG/1; MG/1
5 TABLET ORAL DAILY
Qty: 30 TABLET | Refills: 0 | Status: SHIPPED | OUTPATIENT
Start: 2024-11-18 | End: 2024-12-18

## 2024-11-22 ENCOUNTER — TELEPHONE (OUTPATIENT)
Dept: PRIMARY CARE | Facility: CLINIC | Age: 29
End: 2024-11-22
Payer: COMMERCIAL

## 2024-11-22 DIAGNOSIS — H69.93 DYSFUNCTION OF BOTH EUSTACHIAN TUBES: Primary | ICD-10-CM

## 2024-11-22 RX ORDER — AMOXICILLIN 875 MG/1
875 TABLET, FILM COATED ORAL 2 TIMES DAILY
Qty: 20 TABLET | Refills: 0 | Status: SHIPPED | OUTPATIENT
Start: 2024-11-22 | End: 2024-12-02

## 2024-11-22 NOTE — TELEPHONE ENCOUNTER
Pt called said he has ear infection has crackling and drainage in his ears and he is dizzy when he moves his head.  He is wanting to know if you can send in a RX to [facility] in [place].  Last DOS 8-29-24 no future visit.

## 2024-12-09 ENCOUNTER — SPECIALTY PHARMACY (OUTPATIENT)
Dept: PHARMACY | Facility: CLINIC | Age: 29
End: 2024-12-09

## 2024-12-09 NOTE — PROGRESS NOTES
Unable to reach patient, to remind him to complete SVR labs. Messages were left with my direct extension.

## 2024-12-18 DIAGNOSIS — F90.0 ATTENTION DEFICIT HYPERACTIVITY DISORDER (ADHD), PREDOMINANTLY INATTENTIVE TYPE: ICD-10-CM

## 2024-12-19 RX ORDER — DEXTROAMPHETAMINE SACCHARATE, AMPHETAMINE ASPARTATE, DEXTROAMPHETAMINE SULFATE AND AMPHETAMINE SULFATE 1.25; 1.25; 1.25; 1.25 MG/1; MG/1; MG/1; MG/1
5 TABLET ORAL DAILY
Qty: 30 TABLET | Refills: 0 | Status: SHIPPED | OUTPATIENT
Start: 2024-12-19 | End: 2025-01-18

## 2025-01-08 DIAGNOSIS — F90.0 ATTENTION DEFICIT HYPERACTIVITY DISORDER (ADHD), PREDOMINANTLY INATTENTIVE TYPE: ICD-10-CM

## 2025-01-08 RX ORDER — LISDEXAMFETAMINE DIMESYLATE 20 MG/1
20 CAPSULE ORAL EVERY MORNING
Qty: 30 CAPSULE | Refills: 0 | Status: SHIPPED | OUTPATIENT
Start: 2025-01-08 | End: 2025-02-07

## 2025-01-08 NOTE — TELEPHONE ENCOUNTER
Pt wants to know if he can lower dosage because it is curbing his appetite and makes him jittery even though he is watching his caffeine intake.   Last DOS 8-29-24 next DOS 2-25-25

## 2025-01-29 DIAGNOSIS — F90.0 ATTENTION DEFICIT HYPERACTIVITY DISORDER (ADHD), PREDOMINANTLY INATTENTIVE TYPE: ICD-10-CM

## 2025-01-29 NOTE — TELEPHONE ENCOUNTER
"NOV 2/25/25    Patient having reports having\"adverse effects with vyvanse patient reports \"weaning himself down from the 20 mg dose\" and is currently not taking any vyvanse.     Patient requested this information be sent to you prior to his appointment in Feb.  "

## 2025-01-30 RX ORDER — DEXTROAMPHETAMINE SACCHARATE, AMPHETAMINE ASPARTATE, DEXTROAMPHETAMINE SULFATE AND AMPHETAMINE SULFATE 1.25; 1.25; 1.25; 1.25 MG/1; MG/1; MG/1; MG/1
5 TABLET ORAL DAILY
Qty: 30 TABLET | Refills: 0 | Status: SHIPPED | OUTPATIENT
Start: 2025-01-30 | End: 2025-03-01

## 2025-02-25 ENCOUNTER — APPOINTMENT (OUTPATIENT)
Dept: PRIMARY CARE | Facility: CLINIC | Age: 30
End: 2025-02-25
Payer: COMMERCIAL

## 2025-02-25 DIAGNOSIS — N52.1 ERECTILE DYSFUNCTION DUE TO DISEASES CLASSIFIED ELSEWHERE: ICD-10-CM

## 2025-02-25 DIAGNOSIS — F90.0 ATTENTION DEFICIT HYPERACTIVITY DISORDER (ADHD), PREDOMINANTLY INATTENTIVE TYPE: ICD-10-CM

## 2025-02-25 DIAGNOSIS — F41.1 GAD (GENERALIZED ANXIETY DISORDER): Primary | ICD-10-CM

## 2025-02-25 DIAGNOSIS — F41.0 PANIC ATTACK: ICD-10-CM

## 2025-02-25 PROCEDURE — 99214 OFFICE O/P EST MOD 30 MIN: CPT | Performed by: FAMILY MEDICINE

## 2025-02-25 RX ORDER — CLONAZEPAM 0.5 MG/1
1 TABLET ORAL NIGHTLY
Qty: 60 TABLET | Refills: 2 | Status: SHIPPED | OUTPATIENT
Start: 2025-02-25 | End: 2025-05-26

## 2025-02-25 RX ORDER — DEXTROAMPHETAMINE SACCHARATE, AMPHETAMINE ASPARTATE MONOHYDRATE, DEXTROAMPHETAMINE SULFATE AND AMPHETAMINE SULFATE 2.5; 2.5; 2.5; 2.5 MG/1; MG/1; MG/1; MG/1
10 CAPSULE, EXTENDED RELEASE ORAL EVERY MORNING
Qty: 30 CAPSULE | Refills: 0 | Status: SHIPPED | OUTPATIENT
Start: 2025-02-25 | End: 2025-03-27

## 2025-02-25 RX ORDER — SILDENAFIL 100 MG/1
100 TABLET, FILM COATED ORAL DAILY PRN
Qty: 3 TABLET | Refills: 3 | Status: SHIPPED | OUTPATIENT
Start: 2025-02-25 | End: 2026-02-25

## 2025-02-25 ASSESSMENT — ENCOUNTER SYMPTOMS
DIFFICULTY URINATING: 1
CHEST TIGHTNESS: 0
CHILLS: 0
AGITATION: 1
FATIGUE: 1
FEVER: 0
SHORTNESS OF BREATH: 1
NERVOUS/ANXIOUS: 1
PALPITATIONS: 1

## 2025-02-25 NOTE — PROGRESS NOTES
Subjective   Patient ID: Chris Perkins is a 29 y.o. male who presents for No chief complaint on file..    HPI This visit was completed via audio and visual technology. All issues as below were discussed and addressed and a limited physical exam within the constraints of the technology was performed. If it was felt that the patient should be evaluated in clinic then they were directed there. Verbal consent was requested and obtained from parent/guardian to provide this telehealth service on this date for a telehealth visit.  I spent 20 minutes with patient and/or family, face to face and more than 50% of this time was spent in counseling and coordination of care.     Having a panic attack daily, short of breath, chest pain and palpitations.  Uncle just  and multiple deaths at nursing home residents  Saw  -going to grief support . Has some dark urine -has cut back on caffeine. Drinks a lot of tea. Has left low back pain at times seems to coincide with poor hydration and better with increase water. He has never been tested for stones. Vyvanse not tolerated, adderall xr better, 5mg too low and 20mg too much.     Review of Systems   Constitutional:  Positive for fatigue. Negative for chills and fever.   Respiratory:  Positive for shortness of breath. Negative for chest tightness.    Cardiovascular:  Positive for chest pain and palpitations.   Genitourinary:  Positive for decreased urine volume and difficulty urinating.   Psychiatric/Behavioral:  Positive for agitation. The patient is nervous/anxious.        Objective   There were no vitals taken for this visit.    Physical Exam  Constitutional:       Appearance: Normal appearance.   HENT:      Head: Normocephalic.   Neurological:      General: No focal deficit present.      Mental Status: He is alert and oriented to person, place, and time.   Psychiatric:         Attention and Perception: Attention normal. He is attentive.         Mood and Affect: Mood is  anxious. Mood is not depressed or elated. Affect is tearful. Affect is not labile.         Speech: Speech normal.         Behavior: Behavior normal.         Thought Content: Thought content normal.         Cognition and Memory: Cognition normal.         Assessment/Plan   Problem List Items Addressed This Visit             ICD-10-CM    KELSEY (generalized anxiety disorder) - Primary F41.1    Relevant Medications    clonazePAM (KlonoPIN) 0.5 mg tablet    Attention deficit hyperactivity disorder (ADHD), predominantly inattentive type F90.0    Relevant Medications    amphetamine-dextroamphetamine XR (Adderall XR) 10 mg 24 hr capsule     Other Visit Diagnoses         Codes    Panic attack     F41.0    Erectile dysfunction due to diseases classified elsewhere     N52.1    Relevant Medications    sildenafil (Viagra) 100 mg tablet        Try adderall 10mg a day  Consider us kidneys   Increase hydration with acid/lemon juice water  Encouraged support group/grief counseling

## 2025-03-12 DIAGNOSIS — F90.0 ATTENTION DEFICIT HYPERACTIVITY DISORDER (ADHD), PREDOMINANTLY INATTENTIVE TYPE: ICD-10-CM

## 2025-03-12 DIAGNOSIS — J45.30 MILD PERSISTENT ASTHMA WITHOUT COMPLICATION (HHS-HCC): Primary | ICD-10-CM

## 2025-03-13 RX ORDER — DEXTROAMPHETAMINE SACCHARATE, AMPHETAMINE ASPARTATE, DEXTROAMPHETAMINE SULFATE AND AMPHETAMINE SULFATE 1.25; 1.25; 1.25; 1.25 MG/1; MG/1; MG/1; MG/1
5 TABLET ORAL DAILY
Qty: 30 TABLET | Refills: 0 | Status: SHIPPED | OUTPATIENT
Start: 2025-03-13 | End: 2025-04-12

## 2025-03-13 RX ORDER — ALBUTEROL SULFATE 90 UG/1
2 INHALANT RESPIRATORY (INHALATION) EVERY 4 HOURS
Qty: 18 G | Refills: 1 | Status: SHIPPED | OUTPATIENT
Start: 2025-03-13

## 2025-03-31 DIAGNOSIS — F90.0 ATTENTION DEFICIT HYPERACTIVITY DISORDER (ADHD), PREDOMINANTLY INATTENTIVE TYPE: ICD-10-CM

## 2025-04-01 RX ORDER — DEXTROAMPHETAMINE SACCHARATE, AMPHETAMINE ASPARTATE MONOHYDRATE, DEXTROAMPHETAMINE SULFATE AND AMPHETAMINE SULFATE 2.5; 2.5; 2.5; 2.5 MG/1; MG/1; MG/1; MG/1
10 CAPSULE, EXTENDED RELEASE ORAL EVERY MORNING
Qty: 30 CAPSULE | Refills: 0 | Status: SHIPPED | OUTPATIENT
Start: 2025-04-01 | End: 2025-05-01

## 2025-04-10 DIAGNOSIS — F90.0 ATTENTION DEFICIT HYPERACTIVITY DISORDER (ADHD), PREDOMINANTLY INATTENTIVE TYPE: ICD-10-CM

## 2025-04-13 RX ORDER — DEXTROAMPHETAMINE SACCHARATE, AMPHETAMINE ASPARTATE, DEXTROAMPHETAMINE SULFATE AND AMPHETAMINE SULFATE 1.25; 1.25; 1.25; 1.25 MG/1; MG/1; MG/1; MG/1
5 TABLET ORAL DAILY
Qty: 30 TABLET | Refills: 0 | Status: SHIPPED | OUTPATIENT
Start: 2025-04-13 | End: 2025-05-13

## 2025-04-29 DIAGNOSIS — F41.1 GAD (GENERALIZED ANXIETY DISORDER): ICD-10-CM

## 2025-04-29 RX ORDER — CLONAZEPAM 0.5 MG/1
1 TABLET ORAL NIGHTLY
Qty: 60 TABLET | Refills: 2 | Status: SHIPPED | OUTPATIENT
Start: 2025-04-29 | End: 2025-07-28

## 2025-05-13 DIAGNOSIS — F90.0 ATTENTION DEFICIT HYPERACTIVITY DISORDER (ADHD), PREDOMINANTLY INATTENTIVE TYPE: ICD-10-CM

## 2025-05-13 RX ORDER — DEXTROAMPHETAMINE SACCHARATE, AMPHETAMINE ASPARTATE MONOHYDRATE, DEXTROAMPHETAMINE SULFATE AND AMPHETAMINE SULFATE 2.5; 2.5; 2.5; 2.5 MG/1; MG/1; MG/1; MG/1
10 CAPSULE, EXTENDED RELEASE ORAL EVERY MORNING
Qty: 30 CAPSULE | Refills: 0 | Status: SHIPPED | OUTPATIENT
Start: 2025-05-13 | End: 2025-06-12

## 2025-05-16 DIAGNOSIS — F90.0 ATTENTION DEFICIT HYPERACTIVITY DISORDER (ADHD), PREDOMINANTLY INATTENTIVE TYPE: ICD-10-CM

## 2025-05-18 RX ORDER — DEXTROAMPHETAMINE SACCHARATE, AMPHETAMINE ASPARTATE, DEXTROAMPHETAMINE SULFATE AND AMPHETAMINE SULFATE 1.25; 1.25; 1.25; 1.25 MG/1; MG/1; MG/1; MG/1
5 TABLET ORAL DAILY
Qty: 30 TABLET | Refills: 0 | Status: SHIPPED | OUTPATIENT
Start: 2025-05-18 | End: 2025-06-17

## 2025-07-14 DIAGNOSIS — F41.1 GAD (GENERALIZED ANXIETY DISORDER): Primary | ICD-10-CM

## 2025-07-14 RX ORDER — CLONAZEPAM 0.5 MG/1
1 TABLET ORAL NIGHTLY
Qty: 60 TABLET | Refills: 2 | Status: SHIPPED | OUTPATIENT
Start: 2025-07-14 | End: 2025-10-12

## 2025-08-20 DIAGNOSIS — F41.1 GAD (GENERALIZED ANXIETY DISORDER): ICD-10-CM

## 2025-08-20 RX ORDER — CLONAZEPAM 0.5 MG/1
1 TABLET ORAL NIGHTLY
Qty: 60 TABLET | Refills: 2 | Status: SHIPPED | OUTPATIENT
Start: 2025-08-20 | End: 2025-11-18